# Patient Record
Sex: MALE | Race: WHITE | URBAN - METROPOLITAN AREA
[De-identification: names, ages, dates, MRNs, and addresses within clinical notes are randomized per-mention and may not be internally consistent; named-entity substitution may affect disease eponyms.]

---

## 2018-03-27 ENCOUNTER — APPOINTMENT (OUTPATIENT)
Dept: GENERAL RADIOLOGY | Facility: CLINIC | Age: 73
DRG: 368 | End: 2018-03-27
Attending: PHYSICIAN ASSISTANT
Payer: MEDICARE

## 2018-03-27 ENCOUNTER — APPOINTMENT (OUTPATIENT)
Dept: CT IMAGING | Facility: CLINIC | Age: 73
DRG: 368 | End: 2018-03-27
Attending: EMERGENCY MEDICINE
Payer: MEDICARE

## 2018-03-27 ENCOUNTER — HOSPITAL ENCOUNTER (INPATIENT)
Facility: CLINIC | Age: 73
LOS: 4 days | Discharge: HOME | DRG: 368 | End: 2018-03-31
Attending: EMERGENCY MEDICINE | Admitting: INTERNAL MEDICINE
Payer: MEDICARE

## 2018-03-27 ENCOUNTER — APPOINTMENT (OUTPATIENT)
Dept: ULTRASOUND IMAGING | Facility: CLINIC | Age: 73
DRG: 368 | End: 2018-03-27
Attending: PHYSICIAN ASSISTANT
Payer: MEDICARE

## 2018-03-27 ENCOUNTER — SURGERY (OUTPATIENT)
Age: 73
End: 2018-03-27

## 2018-03-27 ENCOUNTER — APPOINTMENT (OUTPATIENT)
Dept: GENERAL RADIOLOGY | Facility: CLINIC | Age: 73
DRG: 368 | End: 2018-03-27
Attending: EMERGENCY MEDICINE
Payer: MEDICARE

## 2018-03-27 DIAGNOSIS — J96.02 ACUTE RESPIRATORY FAILURE WITH HYPOXIA AND HYPERCAPNIA (H): ICD-10-CM

## 2018-03-27 DIAGNOSIS — G93.40 HEMORRHAGIC SHOCK AND ENCEPHALOPATHY SYNDROME (H): ICD-10-CM

## 2018-03-27 DIAGNOSIS — K92.2 UPPER GI BLEED: Primary | ICD-10-CM

## 2018-03-27 DIAGNOSIS — J96.01 ACUTE RESPIRATORY FAILURE WITH HYPOXIA AND HYPERCAPNIA (H): ICD-10-CM

## 2018-03-27 DIAGNOSIS — I85.01 BLEEDING ESOPHAGEAL VARICES, UNSPECIFIED ESOPHAGEAL VARICES TYPE (H): ICD-10-CM

## 2018-03-27 DIAGNOSIS — Q87.89 HEMORRHAGIC SHOCK AND ENCEPHALOPATHY SYNDROME (H): ICD-10-CM

## 2018-03-27 DIAGNOSIS — R57.8 HEMORRHAGIC SHOCK AND ENCEPHALOPATHY SYNDROME (H): ICD-10-CM

## 2018-03-27 DIAGNOSIS — K92.2 GASTROINTESTINAL HEMORRHAGE, UNSPECIFIED GASTROINTESTINAL HEMORRHAGE TYPE: ICD-10-CM

## 2018-03-27 DIAGNOSIS — D62 ANEMIA DUE TO BLOOD LOSS, ACUTE: ICD-10-CM

## 2018-03-27 DIAGNOSIS — R79.89 ELEVATED TROPONIN: ICD-10-CM

## 2018-03-27 DIAGNOSIS — E11.8 TYPE 2 DIABETES MELLITUS WITH COMPLICATION, UNSPECIFIED LONG TERM INSULIN USE STATUS: ICD-10-CM

## 2018-03-27 LAB
AFP SERPL-MCNC: 2.8 UG/L (ref 0–8)
ALBUMIN SERPL-MCNC: 2.6 G/DL (ref 3.4–5)
ALBUMIN SERPL-MCNC: 2.9 G/DL (ref 3.4–5)
ALP SERPL-CCNC: 65 U/L (ref 40–150)
ALP SERPL-CCNC: 67 U/L (ref 40–150)
ALT SERPL W P-5'-P-CCNC: 33 U/L (ref 0–70)
ALT SERPL W P-5'-P-CCNC: 34 U/L (ref 0–70)
ANION GAP SERPL CALCULATED.3IONS-SCNC: 11 MMOL/L (ref 3–14)
ANION GAP SERPL CALCULATED.3IONS-SCNC: 12 MMOL/L (ref 3–14)
ANION GAP SERPL CALCULATED.3IONS-SCNC: 14 MMOL/L (ref 3–14)
APTT PPP: 32 SEC (ref 22–37)
AST SERPL W P-5'-P-CCNC: 28 U/L (ref 0–45)
AST SERPL W P-5'-P-CCNC: 29 U/L (ref 0–45)
BASE DEFICIT BLDA-SCNC: 3.6 MMOL/L
BASE DEFICIT BLDV-SCNC: 2.7 MMOL/L
BASE DEFICIT BLDV-SCNC: 5.5 MMOL/L
BASOPHILS # BLD AUTO: 0.1 10E9/L (ref 0–0.2)
BASOPHILS NFR BLD AUTO: 0.7 %
BILIRUB SERPL-MCNC: 0.4 MG/DL (ref 0.2–1.3)
BILIRUB SERPL-MCNC: 0.9 MG/DL (ref 0.2–1.3)
BLD PROD TYP BPU: NORMAL
BLD UNIT ID BPU: 0
BLOOD PRODUCT CODE: NORMAL
BPU ID: NORMAL
BUN SERPL-MCNC: 46 MG/DL (ref 7–30)
BUN SERPL-MCNC: 50 MG/DL (ref 7–30)
BUN SERPL-MCNC: 59 MG/DL (ref 7–30)
CA-I SERPL ISE-MCNC: 4.4 MG/DL (ref 4.4–5.2)
CALCIUM SERPL-MCNC: 7.7 MG/DL (ref 8.5–10.1)
CALCIUM SERPL-MCNC: 7.8 MG/DL (ref 8.5–10.1)
CALCIUM SERPL-MCNC: 8.9 MG/DL (ref 8.5–10.1)
CHLORIDE SERPL-SCNC: 104 MMOL/L (ref 94–109)
CHLORIDE SERPL-SCNC: 108 MMOL/L (ref 94–109)
CHLORIDE SERPL-SCNC: 108 MMOL/L (ref 94–109)
CO2 SERPL-SCNC: 20 MMOL/L (ref 20–32)
CO2 SERPL-SCNC: 22 MMOL/L (ref 20–32)
CO2 SERPL-SCNC: 23 MMOL/L (ref 20–32)
CREAT SERPL-MCNC: 0.89 MG/DL (ref 0.66–1.25)
CREAT SERPL-MCNC: 0.98 MG/DL (ref 0.66–1.25)
CREAT SERPL-MCNC: 1.18 MG/DL (ref 0.66–1.25)
DIFFERENTIAL METHOD BLD: ABNORMAL
EOSINOPHIL # BLD AUTO: 0 10E9/L (ref 0–0.7)
EOSINOPHIL NFR BLD AUTO: 0.3 %
ERYTHROCYTE [DISTWIDTH] IN BLOOD BY AUTOMATED COUNT: 14.4 % (ref 10–15)
ERYTHROCYTE [DISTWIDTH] IN BLOOD BY AUTOMATED COUNT: 14.6 % (ref 10–15)
ERYTHROCYTE [DISTWIDTH] IN BLOOD BY AUTOMATED COUNT: 15.4 % (ref 10–15)
ERYTHROCYTE [DISTWIDTH] IN BLOOD BY AUTOMATED COUNT: 15.4 % (ref 10–15)
GFR SERPL CREATININE-BSD FRML MDRD: 60 ML/MIN/1.7M2
GFR SERPL CREATININE-BSD FRML MDRD: 75 ML/MIN/1.7M2
GFR SERPL CREATININE-BSD FRML MDRD: 84 ML/MIN/1.7M2
GLUCOSE BLDC GLUCOMTR-MCNC: 266 MG/DL (ref 70–99)
GLUCOSE BLDC GLUCOMTR-MCNC: 267 MG/DL (ref 70–99)
GLUCOSE BLDC GLUCOMTR-MCNC: 268 MG/DL (ref 70–99)
GLUCOSE BLDC GLUCOMTR-MCNC: 281 MG/DL (ref 70–99)
GLUCOSE SERPL-MCNC: 261 MG/DL (ref 70–99)
GLUCOSE SERPL-MCNC: 262 MG/DL (ref 70–99)
GLUCOSE SERPL-MCNC: 278 MG/DL (ref 70–99)
HBA1C MFR BLD: 8 % (ref 4.3–6)
HCO3 BLD-SCNC: 21 MMOL/L (ref 21–28)
HCO3 BLDV-SCNC: 22 MMOL/L (ref 21–28)
HCO3 BLDV-SCNC: 24 MMOL/L (ref 21–28)
HCT VFR BLD AUTO: 22.4 % (ref 40–53)
HCT VFR BLD AUTO: 23.7 % (ref 40–53)
HCT VFR BLD AUTO: 24.7 % (ref 40–53)
HCT VFR BLD AUTO: 24.9 % (ref 40–53)
HGB BLD-MCNC: 7.9 G/DL (ref 13.3–17.7)
HGB BLD-MCNC: 8.4 G/DL (ref 13.3–17.7)
HGB BLD-MCNC: 8.7 G/DL (ref 13.3–17.7)
HGB BLD-MCNC: 8.8 G/DL (ref 13.3–17.7)
IMM GRANULOCYTES # BLD: 0.1 10E9/L (ref 0–0.4)
IMM GRANULOCYTES NFR BLD: 0.5 %
INR PPP: 1.5 (ref 0.86–1.14)
INTERPRETATION ECG - MUSE: NORMAL
IRON SATN MFR SERPL: 66 % (ref 15–46)
IRON SERPL-MCNC: 173 UG/DL (ref 35–180)
LACTATE BLD-SCNC: 5.6 MMOL/L (ref 0.7–2)
LACTATE BLD-SCNC: 6.4 MMOL/L (ref 0.7–2)
LYMPHOCYTES # BLD AUTO: 1.7 10E9/L (ref 0.8–5.3)
LYMPHOCYTES NFR BLD AUTO: 14.2 %
MCH RBC QN AUTO: 33.2 PG (ref 26.5–33)
MCH RBC QN AUTO: 33.7 PG (ref 26.5–33)
MCH RBC QN AUTO: 34.5 PG (ref 26.5–33)
MCH RBC QN AUTO: 34.7 PG (ref 26.5–33)
MCHC RBC AUTO-ENTMCNC: 35.2 G/DL (ref 31.5–36.5)
MCHC RBC AUTO-ENTMCNC: 35.3 G/DL (ref 31.5–36.5)
MCHC RBC AUTO-ENTMCNC: 35.3 G/DL (ref 31.5–36.5)
MCHC RBC AUTO-ENTMCNC: 35.4 G/DL (ref 31.5–36.5)
MCV RBC AUTO: 94 FL (ref 78–100)
MCV RBC AUTO: 96 FL (ref 78–100)
MCV RBC AUTO: 98 FL (ref 78–100)
MCV RBC AUTO: 98 FL (ref 78–100)
MONOCYTES # BLD AUTO: 0.6 10E9/L (ref 0–1.3)
MONOCYTES NFR BLD AUTO: 5.2 %
NEUTROPHILS # BLD AUTO: 9.5 10E9/L (ref 1.6–8.3)
NEUTROPHILS NFR BLD AUTO: 79.1 %
NRBC # BLD AUTO: 0 10*3/UL
NRBC BLD AUTO-RTO: 0 /100
O2/TOTAL GAS SETTING VFR VENT: 40 %
O2/TOTAL GAS SETTING VFR VENT: 50 %
OXYHGB MFR BLD: 99 % (ref 92–100)
OXYHGB MFR BLDV: 69 %
OXYHGB MFR BLDV: 84 %
PCO2 BLD: 34 MM HG (ref 35–45)
PCO2 BLDV: 48 MM HG (ref 40–50)
PCO2 BLDV: 55 MM HG (ref 40–50)
PH BLD: 7.39 PH (ref 7.35–7.45)
PH BLDV: 7.21 PH (ref 7.32–7.43)
PH BLDV: 7.3 PH (ref 7.32–7.43)
PLATELET # BLD AUTO: 134 10E9/L (ref 150–450)
PLATELET # BLD AUTO: 144 10E9/L (ref 150–450)
PLATELET # BLD AUTO: 151 10E9/L (ref 150–450)
PLATELET # BLD AUTO: 184 10E9/L (ref 150–450)
PO2 BLD: 163 MM HG (ref 80–105)
PO2 BLDV: 43 MM HG (ref 25–47)
PO2 BLDV: 62 MM HG (ref 25–47)
POTASSIUM SERPL-SCNC: 5.3 MMOL/L (ref 3.4–5.3)
POTASSIUM SERPL-SCNC: 5.4 MMOL/L (ref 3.4–5.3)
POTASSIUM SERPL-SCNC: 5.6 MMOL/L (ref 3.4–5.3)
POTASSIUM SERPL-SCNC: 5.9 MMOL/L (ref 3.4–5.3)
PROT SERPL-MCNC: 5.5 G/DL (ref 6.8–8.8)
PROT SERPL-MCNC: 5.7 G/DL (ref 6.8–8.8)
RBC # BLD AUTO: 2.29 10E12/L (ref 4.4–5.9)
RBC # BLD AUTO: 2.42 10E12/L (ref 4.4–5.9)
RBC # BLD AUTO: 2.58 10E12/L (ref 4.4–5.9)
RBC # BLD AUTO: 2.65 10E12/L (ref 4.4–5.9)
SODIUM SERPL-SCNC: 140 MMOL/L (ref 133–144)
SODIUM SERPL-SCNC: 141 MMOL/L (ref 133–144)
SODIUM SERPL-SCNC: 141 MMOL/L (ref 133–144)
TIBC SERPL-MCNC: 261 UG/DL (ref 240–430)
TRANSFUSION STATUS PATIENT QL: NORMAL
TRANSFUSION STATUS PATIENT QL: NORMAL
TROPONIN I SERPL-MCNC: 0.05 UG/L (ref 0–0.04)
TROPONIN I SERPL-MCNC: 0.18 UG/L (ref 0–0.04)
TROPONIN I SERPL-MCNC: 0.29 UG/L (ref 0–0.04)
UPPER GI ENDOSCOPY: NORMAL
WBC # BLD AUTO: 12.1 10E9/L (ref 4–11)
WBC # BLD AUTO: 18.6 10E9/L (ref 4–11)
WBC # BLD AUTO: 25.3 10E9/L (ref 4–11)
WBC # BLD AUTO: 25.4 10E9/L (ref 4–11)

## 2018-03-27 PROCEDURE — 94640 AIRWAY INHALATION TREATMENT: CPT

## 2018-03-27 PROCEDURE — 00000146 ZZHCL STATISTIC GLUCOSE BY METER IP

## 2018-03-27 PROCEDURE — 99285 EMERGENCY DEPT VISIT HI MDM: CPT | Mod: 25

## 2018-03-27 PROCEDURE — 85027 COMPLETE CBC AUTOMATED: CPT | Performed by: PHYSICIAN ASSISTANT

## 2018-03-27 PROCEDURE — 25000128 H RX IP 250 OP 636: Performed by: EMERGENCY MEDICINE

## 2018-03-27 PROCEDURE — 25000128 H RX IP 250 OP 636: Performed by: PHYSICIAN ASSISTANT

## 2018-03-27 PROCEDURE — 84484 ASSAY OF TROPONIN QUANT: CPT | Performed by: EMERGENCY MEDICINE

## 2018-03-27 PROCEDURE — 82805 BLOOD GASES W/O2 SATURATION: CPT | Performed by: PHYSICIAN ASSISTANT

## 2018-03-27 PROCEDURE — 5A1945Z RESPIRATORY VENTILATION, 24-96 CONSECUTIVE HOURS: ICD-10-PCS | Performed by: INTERNAL MEDICINE

## 2018-03-27 PROCEDURE — 25000131 ZZH RX MED GY IP 250 OP 636 PS 637: Mod: GY | Performed by: EMERGENCY MEDICINE

## 2018-03-27 PROCEDURE — 76705 ECHO EXAM OF ABDOMEN: CPT

## 2018-03-27 PROCEDURE — 25000128 H RX IP 250 OP 636: Performed by: INTERNAL MEDICINE

## 2018-03-27 PROCEDURE — 40000275 ZZH STATISTIC RCP TIME EA 10 MIN

## 2018-03-27 PROCEDURE — 84132 ASSAY OF SERUM POTASSIUM: CPT | Performed by: PHYSICIAN ASSISTANT

## 2018-03-27 PROCEDURE — 86901 BLOOD TYPING SEROLOGIC RH(D): CPT | Performed by: EMERGENCY MEDICINE

## 2018-03-27 PROCEDURE — 40000276 ZZH STATISTIC RCP TIME ED VENT EA 10 MIN

## 2018-03-27 PROCEDURE — 80048 BASIC METABOLIC PNL TOTAL CA: CPT | Performed by: INTERNAL MEDICINE

## 2018-03-27 PROCEDURE — 25000125 ZZHC RX 250: Performed by: PHYSICIAN ASSISTANT

## 2018-03-27 PROCEDURE — 36620 INSERTION CATHETER ARTERY: CPT | Performed by: INTERNAL MEDICINE

## 2018-03-27 PROCEDURE — 83540 ASSAY OF IRON: CPT | Performed by: EMERGENCY MEDICINE

## 2018-03-27 PROCEDURE — 40000008 ZZH STATISTIC AIRWAY CARE

## 2018-03-27 PROCEDURE — 86900 BLOOD TYPING SEROLOGIC ABO: CPT | Performed by: EMERGENCY MEDICINE

## 2018-03-27 PROCEDURE — 25000125 ZZHC RX 250

## 2018-03-27 PROCEDURE — 25000125 ZZHC RX 250: Performed by: INTERNAL MEDICINE

## 2018-03-27 PROCEDURE — 43244 EGD VARICES LIGATION: CPT | Performed by: INTERNAL MEDICINE

## 2018-03-27 PROCEDURE — 25000128 H RX IP 250 OP 636

## 2018-03-27 PROCEDURE — 86850 RBC ANTIBODY SCREEN: CPT | Performed by: EMERGENCY MEDICINE

## 2018-03-27 PROCEDURE — 86706 HEP B SURFACE ANTIBODY: CPT | Performed by: EMERGENCY MEDICINE

## 2018-03-27 PROCEDURE — A9270 NON-COVERED ITEM OR SERVICE: HCPCS | Mod: GY | Performed by: PHYSICIAN ASSISTANT

## 2018-03-27 PROCEDURE — 85610 PROTHROMBIN TIME: CPT | Performed by: EMERGENCY MEDICINE

## 2018-03-27 PROCEDURE — 31500 INSERT EMERGENCY AIRWAY: CPT

## 2018-03-27 PROCEDURE — 25000131 ZZH RX MED GY IP 250 OP 636 PS 637: Mod: GY | Performed by: PHYSICIAN ASSISTANT

## 2018-03-27 PROCEDURE — 80053 COMPREHEN METABOLIC PANEL: CPT | Performed by: PHYSICIAN ASSISTANT

## 2018-03-27 PROCEDURE — 86923 COMPATIBILITY TEST ELECTRIC: CPT | Performed by: EMERGENCY MEDICINE

## 2018-03-27 PROCEDURE — 82805 BLOOD GASES W/O2 SATURATION: CPT | Performed by: INTERNAL MEDICINE

## 2018-03-27 PROCEDURE — 96366 THER/PROPH/DIAG IV INF ADDON: CPT

## 2018-03-27 PROCEDURE — 86704 HEP B CORE ANTIBODY TOTAL: CPT | Performed by: EMERGENCY MEDICINE

## 2018-03-27 PROCEDURE — 86803 HEPATITIS C AB TEST: CPT | Performed by: EMERGENCY MEDICINE

## 2018-03-27 PROCEDURE — 82330 ASSAY OF CALCIUM: CPT | Performed by: PHYSICIAN ASSISTANT

## 2018-03-27 PROCEDURE — 85730 THROMBOPLASTIN TIME PARTIAL: CPT | Performed by: EMERGENCY MEDICINE

## 2018-03-27 PROCEDURE — 25000131 ZZH RX MED GY IP 250 OP 636 PS 637: Mod: GY | Performed by: INTERNAL MEDICINE

## 2018-03-27 PROCEDURE — 96365 THER/PROPH/DIAG IV INF INIT: CPT

## 2018-03-27 PROCEDURE — 85027 COMPLETE CBC AUTOMATED: CPT | Performed by: INTERNAL MEDICINE

## 2018-03-27 PROCEDURE — 25000125 ZZHC RX 250: Performed by: EMERGENCY MEDICINE

## 2018-03-27 PROCEDURE — 20000003 ZZH R&B ICU

## 2018-03-27 PROCEDURE — 25000132 ZZH RX MED GY IP 250 OP 250 PS 637: Mod: GY | Performed by: PHYSICIAN ASSISTANT

## 2018-03-27 PROCEDURE — 83605 ASSAY OF LACTIC ACID: CPT | Performed by: PHYSICIAN ASSISTANT

## 2018-03-27 PROCEDURE — 83550 IRON BINDING TEST: CPT | Performed by: EMERGENCY MEDICINE

## 2018-03-27 PROCEDURE — 80053 COMPREHEN METABOLIC PANEL: CPT | Performed by: EMERGENCY MEDICINE

## 2018-03-27 PROCEDURE — 83036 HEMOGLOBIN GLYCOSYLATED A1C: CPT | Performed by: PHYSICIAN ASSISTANT

## 2018-03-27 PROCEDURE — P9016 RBC LEUKOCYTES REDUCED: HCPCS | Performed by: EMERGENCY MEDICINE

## 2018-03-27 PROCEDURE — 36556 INSERT NON-TUNNEL CV CATH: CPT | Performed by: PHYSICIAN ASSISTANT

## 2018-03-27 PROCEDURE — 75635 CT ANGIO ABDOMINAL ARTERIES: CPT

## 2018-03-27 PROCEDURE — 84484 ASSAY OF TROPONIN QUANT: CPT | Performed by: PHYSICIAN ASSISTANT

## 2018-03-27 PROCEDURE — 06L38CZ OCCLUSION OF ESOPHAGEAL VEIN WITH EXTRALUMINAL DEVICE, VIA NATURAL OR ARTIFICIAL OPENING ENDOSCOPIC: ICD-10-PCS | Performed by: INTERNAL MEDICINE

## 2018-03-27 PROCEDURE — 83605 ASSAY OF LACTIC ACID: CPT | Performed by: INTERNAL MEDICINE

## 2018-03-27 PROCEDURE — 40000986 XR CHEST PORT 1 VW

## 2018-03-27 PROCEDURE — 02HV33Z INSERTION OF INFUSION DEVICE INTO SUPERIOR VENA CAVA, PERCUTANEOUS APPROACH: ICD-10-PCS | Performed by: INTERNAL MEDICINE

## 2018-03-27 PROCEDURE — 82105 ALPHA-FETOPROTEIN SERUM: CPT | Performed by: INTERNAL MEDICINE

## 2018-03-27 PROCEDURE — 36415 COLL VENOUS BLD VENIPUNCTURE: CPT | Performed by: PHYSICIAN ASSISTANT

## 2018-03-27 PROCEDURE — 93005 ELECTROCARDIOGRAM TRACING: CPT

## 2018-03-27 PROCEDURE — 99291 CRITICAL CARE FIRST HOUR: CPT | Mod: 25 | Performed by: INTERNAL MEDICINE

## 2018-03-27 PROCEDURE — 96375 TX/PRO/DX INJ NEW DRUG ADDON: CPT

## 2018-03-27 PROCEDURE — 71045 X-RAY EXAM CHEST 1 VIEW: CPT

## 2018-03-27 PROCEDURE — 85025 COMPLETE CBC W/AUTO DIFF WBC: CPT | Performed by: EMERGENCY MEDICINE

## 2018-03-27 RX ORDER — OCTREOTIDE ACETATE 50 UG/ML
50 INJECTION, SOLUTION INTRAVENOUS; SUBCUTANEOUS ONCE
Status: COMPLETED | OUTPATIENT
Start: 2018-03-27 | End: 2018-03-27

## 2018-03-27 RX ORDER — NALOXONE HYDROCHLORIDE 0.4 MG/ML
.1-.4 INJECTION, SOLUTION INTRAMUSCULAR; INTRAVENOUS; SUBCUTANEOUS
Status: DISCONTINUED | OUTPATIENT
Start: 2018-03-27 | End: 2018-03-31 | Stop reason: HOSPADM

## 2018-03-27 RX ORDER — SODIUM CHLORIDE 9 MG/ML
1000 INJECTION, SOLUTION INTRAVENOUS CONTINUOUS
Status: DISCONTINUED | OUTPATIENT
Start: 2018-03-27 | End: 2018-03-27

## 2018-03-27 RX ORDER — ALBUMIN, HUMAN INJ 5% 5 %
SOLUTION INTRAVENOUS
Status: DISCONTINUED
Start: 2018-03-27 | End: 2018-03-27 | Stop reason: WASHOUT

## 2018-03-27 RX ORDER — METOCLOPRAMIDE HYDROCHLORIDE 5 MG/ML
10 INJECTION INTRAMUSCULAR; INTRAVENOUS ONCE
Status: DISCONTINUED | OUTPATIENT
Start: 2018-03-27 | End: 2018-03-27

## 2018-03-27 RX ORDER — DEXTROSE MONOHYDRATE 25 G/50ML
25-50 INJECTION, SOLUTION INTRAVENOUS
Status: DISCONTINUED | OUTPATIENT
Start: 2018-03-27 | End: 2018-03-27

## 2018-03-27 RX ORDER — NALOXONE HYDROCHLORIDE 0.4 MG/ML
.1-.4 INJECTION, SOLUTION INTRAMUSCULAR; INTRAVENOUS; SUBCUTANEOUS
Status: DISCONTINUED | OUTPATIENT
Start: 2018-03-27 | End: 2018-03-27

## 2018-03-27 RX ORDER — DEXTROSE MONOHYDRATE 100 MG/ML
INJECTION, SOLUTION INTRAVENOUS CONTINUOUS
Status: DISCONTINUED | OUTPATIENT
Start: 2018-03-27 | End: 2018-03-27

## 2018-03-27 RX ORDER — VECURONIUM BROMIDE 1 MG/ML
INJECTION, POWDER, LYOPHILIZED, FOR SOLUTION INTRAVENOUS
Status: COMPLETED
Start: 2018-03-27 | End: 2018-03-27

## 2018-03-27 RX ORDER — NOREPINEPHRINE BITARTRATE/D5W 16MG/250ML
0.03-0.4 PLASTIC BAG, INJECTION (ML) INTRAVENOUS CONTINUOUS
Status: DISCONTINUED | OUTPATIENT
Start: 2018-03-27 | End: 2018-03-27

## 2018-03-27 RX ORDER — NICOTINE POLACRILEX 4 MG
15-30 LOZENGE BUCCAL
Status: DISCONTINUED | OUTPATIENT
Start: 2018-03-27 | End: 2018-03-31 | Stop reason: HOSPADM

## 2018-03-27 RX ORDER — DEXTROSE MONOHYDRATE 25 G/50ML
25-50 INJECTION, SOLUTION INTRAVENOUS
Status: DISCONTINUED | OUTPATIENT
Start: 2018-03-27 | End: 2018-03-31 | Stop reason: HOSPADM

## 2018-03-27 RX ORDER — LIDOCAINE 40 MG/G
CREAM TOPICAL
Status: CANCELLED | OUTPATIENT
Start: 2018-03-27

## 2018-03-27 RX ORDER — DEXTROSE MONOHYDRATE 25 G/50ML
25 INJECTION, SOLUTION INTRAVENOUS ONCE
Status: DISCONTINUED | OUTPATIENT
Start: 2018-03-27 | End: 2018-03-27

## 2018-03-27 RX ORDER — ALBUTEROL SULFATE 0.83 MG/ML
10 SOLUTION RESPIRATORY (INHALATION) ONCE
Status: COMPLETED | OUTPATIENT
Start: 2018-03-27 | End: 2018-03-27

## 2018-03-27 RX ORDER — FENTANYL CITRATE 50 UG/ML
25 INJECTION, SOLUTION INTRAMUSCULAR; INTRAVENOUS EVERY 5 MIN PRN
Status: DISCONTINUED | OUTPATIENT
Start: 2018-03-27 | End: 2018-03-27

## 2018-03-27 RX ORDER — ONDANSETRON 4 MG/1
4 TABLET, ORALLY DISINTEGRATING ORAL EVERY 6 HOURS PRN
Status: DISCONTINUED | OUTPATIENT
Start: 2018-03-27 | End: 2018-03-31 | Stop reason: HOSPADM

## 2018-03-27 RX ORDER — ONDANSETRON 2 MG/ML
4 INJECTION INTRAMUSCULAR; INTRAVENOUS EVERY 6 HOURS PRN
Status: DISCONTINUED | OUTPATIENT
Start: 2018-03-27 | End: 2018-03-31 | Stop reason: HOSPADM

## 2018-03-27 RX ORDER — HYDROMORPHONE HYDROCHLORIDE 1 MG/ML
0.2 INJECTION, SOLUTION INTRAMUSCULAR; INTRAVENOUS; SUBCUTANEOUS
Status: DISCONTINUED | OUTPATIENT
Start: 2018-03-27 | End: 2018-03-31 | Stop reason: HOSPADM

## 2018-03-27 RX ORDER — SODIUM CHLORIDE 9 MG/ML
INJECTION, SOLUTION INTRAVENOUS CONTINUOUS
Status: DISCONTINUED | OUTPATIENT
Start: 2018-03-27 | End: 2018-03-30

## 2018-03-27 RX ORDER — CHLORHEXIDINE GLUCONATE ORAL RINSE 1.2 MG/ML
15 SOLUTION DENTAL EVERY 12 HOURS
Status: DISCONTINUED | OUTPATIENT
Start: 2018-03-27 | End: 2018-03-29

## 2018-03-27 RX ORDER — CEFTRIAXONE 1 G/1
1 INJECTION, POWDER, FOR SOLUTION INTRAMUSCULAR; INTRAVENOUS EVERY 24 HOURS
Status: DISCONTINUED | OUTPATIENT
Start: 2018-03-27 | End: 2018-03-31 | Stop reason: HOSPADM

## 2018-03-27 RX ORDER — IOPAMIDOL 755 MG/ML
100 INJECTION, SOLUTION INTRAVASCULAR ONCE
Status: COMPLETED | OUTPATIENT
Start: 2018-03-27 | End: 2018-03-27

## 2018-03-27 RX ORDER — PROPOFOL 10 MG/ML
5-75 INJECTION, EMULSION INTRAVENOUS CONTINUOUS
Status: DISCONTINUED | OUTPATIENT
Start: 2018-03-27 | End: 2018-03-29

## 2018-03-27 RX ORDER — FENTANYL CITRATE 50 UG/ML
50 INJECTION, SOLUTION INTRAMUSCULAR; INTRAVENOUS
Status: DISCONTINUED | OUTPATIENT
Start: 2018-03-27 | End: 2018-03-27

## 2018-03-27 RX ORDER — LORAZEPAM 2 MG/ML
INJECTION INTRAMUSCULAR
Status: COMPLETED
Start: 2018-03-27 | End: 2018-03-27

## 2018-03-27 RX ORDER — AMOXICILLIN 250 MG
1 CAPSULE ORAL 2 TIMES DAILY PRN
Status: DISCONTINUED | OUTPATIENT
Start: 2018-03-27 | End: 2018-03-30

## 2018-03-27 RX ORDER — AMOXICILLIN 250 MG
2 CAPSULE ORAL 2 TIMES DAILY PRN
Status: DISCONTINUED | OUTPATIENT
Start: 2018-03-27 | End: 2018-03-30

## 2018-03-27 RX ORDER — ETOMIDATE 2 MG/ML
INJECTION INTRAVENOUS
Status: DISCONTINUED
Start: 2018-03-27 | End: 2018-03-27 | Stop reason: HOSPADM

## 2018-03-27 RX ORDER — FLUMAZENIL 0.1 MG/ML
0.2 INJECTION, SOLUTION INTRAVENOUS
Status: DISCONTINUED | OUTPATIENT
Start: 2018-03-27 | End: 2018-03-27

## 2018-03-27 RX ORDER — PROPOFOL 10 MG/ML
5-75 INJECTION, EMULSION INTRAVENOUS CONTINUOUS
Status: DISCONTINUED | OUTPATIENT
Start: 2018-03-27 | End: 2018-03-27

## 2018-03-27 RX ORDER — NICOTINE POLACRILEX 4 MG
15-30 LOZENGE BUCCAL
Status: DISCONTINUED | OUTPATIENT
Start: 2018-03-27 | End: 2018-03-27

## 2018-03-27 RX ORDER — CEFTRIAXONE 2 G/1
2 INJECTION, POWDER, FOR SOLUTION INTRAMUSCULAR; INTRAVENOUS ONCE
Status: COMPLETED | OUTPATIENT
Start: 2018-03-27 | End: 2018-03-27

## 2018-03-27 RX ORDER — NOREPINEPHRINE BITARTRATE/D5W 16MG/250ML
0.03-0.4 PLASTIC BAG, INJECTION (ML) INTRAVENOUS CONTINUOUS
Status: DISCONTINUED | OUTPATIENT
Start: 2018-03-27 | End: 2018-03-29

## 2018-03-27 RX ORDER — ETOMIDATE 2 MG/ML
10 INJECTION INTRAVENOUS ONCE
Status: COMPLETED | OUTPATIENT
Start: 2018-03-27 | End: 2018-03-27

## 2018-03-27 RX ORDER — ONDANSETRON 2 MG/ML
4 INJECTION INTRAMUSCULAR; INTRAVENOUS EVERY 30 MIN PRN
Status: DISCONTINUED | OUTPATIENT
Start: 2018-03-27 | End: 2018-03-27

## 2018-03-27 RX ORDER — TAMSULOSIN HYDROCHLORIDE 0.4 MG/1
0.4 CAPSULE ORAL DAILY
COMMUNITY

## 2018-03-27 RX ADMIN — ALBUTEROL SULFATE 10 MG: 2.5 SOLUTION RESPIRATORY (INHALATION) at 18:24

## 2018-03-27 RX ADMIN — PROPOFOL 60 MCG/KG/MIN: 10 INJECTION, EMULSION INTRAVENOUS at 13:02

## 2018-03-27 RX ADMIN — ETOMIDATE 10 MG: 20 INJECTION, SOLUTION INTRAVENOUS at 12:09

## 2018-03-27 RX ADMIN — SODIUM CHLORIDE 8 MG/HR: 9 INJECTION, SOLUTION INTRAVENOUS at 15:23

## 2018-03-27 RX ADMIN — PROPOFOL 50 MCG/KG/MIN: 10 INJECTION, EMULSION INTRAVENOUS at 15:04

## 2018-03-27 RX ADMIN — SODIUM CHLORIDE 1000 ML: 9 INJECTION, SOLUTION INTRAVENOUS at 12:53

## 2018-03-27 RX ADMIN — PROPOFOL 30 MCG/KG/MIN: 10 INJECTION, EMULSION INTRAVENOUS at 20:52

## 2018-03-27 RX ADMIN — IOPAMIDOL 100 ML: 755 INJECTION, SOLUTION INTRAVENOUS at 11:55

## 2018-03-27 RX ADMIN — SODIUM CHLORIDE 3.5 UNITS/HR: 9 INJECTION, SOLUTION INTRAVENOUS at 22:23

## 2018-03-27 RX ADMIN — ONDANSETRON 4 MG: 2 INJECTION INTRAMUSCULAR; INTRAVENOUS at 11:40

## 2018-03-27 RX ADMIN — PROPOFOL 40 MCG/KG/MIN: 10 INJECTION, EMULSION INTRAVENOUS at 12:43

## 2018-03-27 RX ADMIN — SODIUM CHLORIDE: 9 INJECTION, SOLUTION INTRAVENOUS at 20:58

## 2018-03-27 RX ADMIN — VECURONIUM BROMIDE 10 MG: 1 INJECTION, POWDER, LYOPHILIZED, FOR SOLUTION INTRAVENOUS at 13:00

## 2018-03-27 RX ADMIN — PROPOFOL 20 MCG/KG/MIN: 10 INJECTION, EMULSION INTRAVENOUS at 12:36

## 2018-03-27 RX ADMIN — LORAZEPAM 2 MG: 2 INJECTION INTRAMUSCULAR; INTRAVENOUS at 13:00

## 2018-03-27 RX ADMIN — PROPOFOL 10 MCG/KG/MIN: 10 INJECTION, EMULSION INTRAVENOUS at 12:29

## 2018-03-27 RX ADMIN — SODIUM CHLORIDE, PRESERVATIVE FREE 80 ML: 5 INJECTION INTRAVENOUS at 11:55

## 2018-03-27 RX ADMIN — PROPOFOL 5 MCG/KG/MIN: 10 INJECTION, EMULSION INTRAVENOUS at 12:20

## 2018-03-27 RX ADMIN — FENTANYL CITRATE 25 MCG: 50 INJECTION INTRAMUSCULAR; INTRAVENOUS at 16:30

## 2018-03-27 RX ADMIN — PANTOPRAZOLE SODIUM 80 MG: 40 INJECTION, POWDER, FOR SOLUTION INTRAVENOUS at 11:32

## 2018-03-27 RX ADMIN — PHYTONADIONE 5 MG: 10 INJECTION, EMULSION INTRAMUSCULAR; INTRAVENOUS; SUBCUTANEOUS at 17:33

## 2018-03-27 RX ADMIN — OCTREOTIDE ACETATE 50 MCG/HR: 200 INJECTION, SOLUTION INTRAVENOUS; SUBCUTANEOUS at 14:12

## 2018-03-27 RX ADMIN — OCTREOTIDE ACETATE 50 MCG: 50 INJECTION, SOLUTION INTRAVENOUS; SUBCUTANEOUS at 13:54

## 2018-03-27 RX ADMIN — SUCCINYLCHOLINE CHLORIDE 150 MG: 20 INJECTION, SOLUTION INTRAMUSCULAR; INTRAVENOUS at 12:09

## 2018-03-27 RX ADMIN — SODIUM CHLORIDE 1000 ML: 9 INJECTION, SOLUTION INTRAVENOUS at 11:58

## 2018-03-27 RX ADMIN — INSULIN ASPART 6 UNITS: 100 INJECTION, SOLUTION INTRAVENOUS; SUBCUTANEOUS at 17:52

## 2018-03-27 RX ADMIN — SODIUM CHLORIDE 500 ML: 9 INJECTION, SOLUTION INTRAVENOUS at 20:57

## 2018-03-27 RX ADMIN — CEFTRIAXONE 2 G: 2 INJECTION, POWDER, FOR SOLUTION INTRAMUSCULAR; INTRAVENOUS at 14:09

## 2018-03-27 RX ADMIN — PROPOFOL 15 MCG/KG/MIN: 10 INJECTION, EMULSION INTRAVENOUS at 12:33

## 2018-03-27 RX ADMIN — SODIUM CHLORIDE 1000 ML: 9 INJECTION, SOLUTION INTRAVENOUS at 11:59

## 2018-03-27 RX ADMIN — SODIUM CHLORIDE 8 MG/HR: 9 INJECTION, SOLUTION INTRAVENOUS at 23:29

## 2018-03-27 RX ADMIN — PROPOFOL 5 MCG/KG/MIN: 10 INJECTION, EMULSION INTRAVENOUS at 12:25

## 2018-03-27 RX ADMIN — NOREPINEPHRINE BITARTRATE 0.03 MCG/KG/MIN: 1 INJECTION INTRAVENOUS at 18:07

## 2018-03-27 RX ADMIN — Medication 0.05 MCG/KG/MIN: at 20:53

## 2018-03-27 RX ADMIN — CHLORHEXIDINE GLUCONATE 15 ML: 1.2 RINSE ORAL at 22:42

## 2018-03-27 ASSESSMENT — ENCOUNTER SYMPTOMS
VOMITING: 1
DIZZINESS: 0
LIGHT-HEADEDNESS: 0
SHORTNESS OF BREATH: 0
BLOOD IN STOOL: 1
ABDOMINAL PAIN: 0
WEAKNESS: 0

## 2018-03-27 NOTE — PROCEDURES
"Procedure/Surgery Information   [facility]    Bedside Procedure Note  Date of Service (when I performed the procedure): 03/27/2018    Juan Quiroz is a 72 year old male patient.  1. Gastrointestinal hemorrhage, unspecified gastrointestinal hemorrhage type    2. Anemia due to blood loss, acute    3. Elevated troponin, likely demand-related ischemia    4. Bleeding esophageal and gastric varices, unspecified esophageal varices type (H)      Past Medical History:   Diagnosis Date     Aneurysm (H)      Chronic kidney disease     MASS     Diabetes (H)      Pancreatitis      Temp: 96.4  F (35.8  C) Temp src: Bladder BP: 96/44   Heart Rate: 71 Resp: 17 SpO2: 100 % O2 Device: Mechanical Ventilator      Central line  Date/Time: 3/27/2018 4:49 PM  Performed by: CHIOMA BOWMAN  Authorized by: CHIOMA BOWMAN   Consent: Verbal consent obtained.  Consent given by: spouse  Patient identity confirmed: arm band  Time out: Immediately prior to procedure a \"time out\" was called to verify the correct patient, procedure, equipment, support staff and site/side marked as required.  Indications: vascular access    Sedation:  Patient sedated: yes  Sedatives: propofol    Preparation: skin prepped with chlorhexidine  Location details: right internal jugular  Patient position: Trendelenburg  Catheter type: triple lumen  Ultrasound guidance: yes  Number of attempts: 2  Successful placement: yes  Post-procedure: line sutured and dressing applied  Assessment: blood return through all parts  Patient tolerance: Patient tolerated the procedure well with no immediate complications  Comments: Procedure supervised by Dr. Barahona. Pending placement confirmation with CXR.            Chioma Bowman  "

## 2018-03-27 NOTE — H&P
[facility]    History and Physical/ Consult  Critical Care Service     Date of Admission:  3/27/2018  Date of Service (when I saw the patient): 03/27/18    Problem List  Upper GI bleed 2/2 esophageal varices s/p banding   Newly diagnosed cirrhosis, unclear etiology  Intubation fir airway protection   Acute blood loss anemia  Thrombocytopenia   Hypotension likely 2/2 hypovolemia     Assessment & Plan   Juan Quiroz is a 72 year old male with PMHx of recently diagnosed 4cm AAA and type II DM who presents with bright red hematemesis, intubated for airway protection. Found to have esophageal and gastric varices on EGD s/p banding x5 with new diagnosis of cirrhosis.    Neuro  1. Acute pain  2. Sedation  Plan:  -- propofol gtt for sedation  -- PRN dilaudid as needed     CV  1. Hypotension, likely 2/2 hypovolemia   2. AAA, recently diagnosed   Plan:  -- small troponin elevated 0.049, recheck troponin   -- CT abdomen shows AAA 4.9cm, previously recorded at 4cm  -- check VBG, check LA    Resp:  1. Intubation for airway protection   Plan:  -- AC ventilation   -- CXR shows appropriately placed ETT  -- can PS and extubate once hemodynamically stable     GI/Nutrition  1. GI bleed-hematemesis with bright red blood- found to have varices on EGD s/p banding   2. Cirrhosis, newly diagnosed, unclear etiology   Plan:  -- NPO  -- PPI gtt and octreotide gtt   -- GI following   -- pending work up for cirrhosis includes alpha 1 antitrypsin, hepatitis panel, mitochondrial antibody  -- underwent EGD on 3/27 in ED, found to have 2 areas of variceal bleeding in esophagus and stomach, banded x5  -- INR elevated to 1.5, platelets low at 134 all suggestive of cirrhosis   -- will give 5mg vitamin K  -- ALT, AST, alk phos, bili WNL  -- will obtain additional history from wife     Renal/  1. No prior hx.  Baseline appears to be 0.89  Plan:  -- Monitor Cr and UOP  -- keep bliss cath in place   -- repeat  CMP    ID  1. Leukocytosis   Plan:  -- likely reactive, recheck WBC  -- ceftriaxone post procedure     Endocrine  1. Type II DM, hyperglycemia   Plan:  -- high SSI, will add insulin gtt if needed   -- PTA agents, glipizide and metformin   -- Keep BG  <180 for optimal healing    Heme:  1. Acute blood loss anemia  2. Thrombocytopenia   3. Coagulopathy   Plan:  -- Hgb 8.4, platelets 134, INR 1.5  -- will start with 1 unit PRBCs  -- give 5mg vit K  -- recheck Hgb  -- Monitor hemoglobin.  -- Transfuse to keep > 7.0    General cares:  DVT Prophylaxis: Pneumatic Compression Devices  GI Prophylaxis: PPI  Restraints: Restraints for medical healing needed: YES  Family update by me today: No  Current lines are required for patient management  Access:  Peripheral IV (18 x3)    Chioma Bowman    Time Spent on this Encounter   Billing:  I spent 45 minutes bedside and on the inpatient unit today managing the critical care of Juan Quiroz in relation to the issues listed in this note.    This is time outside of procedure time.     Code Status   Need to discuss with family     Primary Care Physician   No primary care provider on file.    Chief Complaint   Hematemesis     History is obtained from the patient and electronic health record    History of Present Illness   Juan Quiroz is a 72 year old male with PMHx of recently diagnosed 4cm AAA who presents with hematemesis. The patient lives in [place] and was on a flight to [place] when he began to have hematemesis this morning on his flight. Per chart review, the patient did not one dark stool 2 days prior to this episode. The patient was met by EMS after landing. He was noted to be hypotensive and continued to have hematemesis in route.     On admission to the ED, the patient was afebrile, hypotensive with systolics in the 80s, HR 70-80. Patient was intubated for airway protection. Labs remarkable for a blood sugar of 261. Hgb 8.4, platelets 134, WBC 12.1. Trop slightly  elevated at 0.049. CT angio abdominal showed no evidence of Gi bleed, infrarenal AAA is 4.9, evidence of cirrhosis and splenomegaly. GI was consulted. The patient was started on protonix, given 2 L NS. Patient underwent an EGD in the ED.     Past Medical History    4cm AAA  Type II DM    Past Surgical History   I have reviewed this patient's surgical history and updated it with pertinent information if needed.  Past Surgical History:   Procedure Laterality Date     EYE SURGERY      CATARACT       Prior to Admission Medications   Prior to Admission Medications   Prescriptions Last Dose Informant Patient Reported? Taking?   Celecoxib (CELEBREX PO)   Yes Yes   Sig: Take 200 mg by mouth as needed for moderate pain   GLIPIZIDE XL PO   Yes Yes   Sig: Take 5 mg by mouth   METFORMIN HCL PO   Yes Yes   Sig: Take 1,000 mg by mouth   tamsulosin (FLOMAX) 0.4 MG capsule   Yes Yes   Sig: Take 0.4 mg by mouth daily      Facility-Administered Medications: None     Allergies   Allergies   Allergen Reactions     Prednisone        Social History   I have reviewed this patient's social history and updated it with pertinent information if needed. Juan CLAY Jamesonviktoriajenna   Patient lives in [place] and was on a flight to [place]. No apparent drinking hx.     Family History   I have reviewed this patient's family history and updated it with pertinent information if needed.   No family history on file.    Review of Systems   Unable to assess as patient intubated and sedated.     Physical Exam   Temp: 98  F (36.7  C)   Temp  Min: 98  F (36.7  C)  Max: 98  F (36.7  C) BP: 125/63   Heart Rate: 96 Resp: 23 SpO2: 100 % O2 Device: None (Room air)    Vital Signs with Ranges  Temp:  [98  F (36.7  C)] 98  F (36.7  C)  Heart Rate:  [] 96  Resp:  [7-27] 23  BP: ()/() 125/63  SpO2:  [98 %-100 %] 100 %  180 lbs 0 oz    Constitutional:  male, laying in bed, sedated on exam   HEENT: atraumatic, normocephalic, PERRL, non-icteric    Respiratory: CTAB, no wheezes or rales   Cardiovascular: RRR  GI: abdomen is soft, nondistended   Genitourinary: bliss cath in place, urine is clear and yellow   Skin: warm and dry   Musculoskeletal: no lower extremity edema   Neurologic: sedated on exam, PERRL    Data   Results for orders placed or performed during the hospital encounter of 03/27/18 (from the past 24 hour(s))   POC US ABDOMEN LIMITED    Impression    AAA identified, but no signs of rupture.   CBC with platelets differential   Result Value Ref Range    WBC 12.1 (H) 4.0 - 11.0 10e9/L    RBC Count 2.42 (L) 4.4 - 5.9 10e12/L    Hemoglobin 8.4 (L) 13.3 - 17.7 g/dL    Hematocrit 23.7 (L) 40.0 - 53.0 %    MCV 98 78 - 100 fl    MCH 34.7 (H) 26.5 - 33.0 pg    MCHC 35.4 31.5 - 36.5 g/dL    RDW 14.4 10.0 - 15.0 %    Platelet Count 134 (L) 150 - 450 10e9/L    Diff Method Automated Method     % Neutrophils 79.1 %    % Lymphocytes 14.2 %    % Monocytes 5.2 %    % Eosinophils 0.3 %    % Basophils 0.7 %    % Immature Granulocytes 0.5 %    Nucleated RBCs 0 0 /100    Absolute Neutrophil 9.5 (H) 1.6 - 8.3 10e9/L    Absolute Lymphocytes 1.7 0.8 - 5.3 10e9/L    Absolute Monocytes 0.6 0.0 - 1.3 10e9/L    Absolute Eosinophils 0.0 0.0 - 0.7 10e9/L    Absolute Basophils 0.1 0.0 - 0.2 10e9/L    Abs Immature Granulocytes 0.1 0 - 0.4 10e9/L    Absolute Nucleated RBC 0.0    INR   Result Value Ref Range    INR 1.50 (H) 0.86 - 1.14   ABO/Rh type and screen   Result Value Ref Range    Units Ordered 1     ABO B     RH(D) Pos     Antibody Screen Neg     Test Valid Only At [facility]        Specimen Expires 03/30/2018     Crossmatch Red Blood Cells    Partial thromboplastin time   Result Value Ref Range    PTT 32 22 - 37 sec   Comprehensive metabolic panel   Result Value Ref Range    Sodium 141 133 - 144 mmol/L    Potassium 5.3 3.4 - 5.3 mmol/L    Chloride 104 94 - 109 mmol/L    Carbon Dioxide 23 20 - 32 mmol/L    Anion Gap 14 3 - 14 mmol/L    Glucose 261 (H) 70 -  99 mg/dL    Urea Nitrogen 46 (H) 7 - 30 mg/dL    Creatinine 0.89 0.66 - 1.25 mg/dL    GFR Estimate 84 >60 mL/min/1.7m2    GFR Estimate If Black >90 >60 mL/min/1.7m2    Calcium 8.9 8.5 - 10.1 mg/dL    Bilirubin Total 0.9 0.2 - 1.3 mg/dL    Albumin 2.9 (L) 3.4 - 5.0 g/dL    Protein Total 5.7 (L) 6.8 - 8.8 g/dL    Alkaline Phosphatase 67 40 - 150 U/L    ALT 33 0 - 70 U/L    AST 28 0 - 45 U/L   Troponin I   Result Value Ref Range    Troponin I ES 0.049 (H) 0.000 - 0.045 ug/L   Blood component   Result Value Ref Range    Unit Number [id number]     Blood Component Type Red Blood Cells Leukocyte Reduced     Division Number 00     Status of Unit Ready for patient 03/27/2018 1224     Blood Product Code [id number]     Unit Status ANNEL    EKG 12-lead, tracing only   Result Value Ref Range    Interpretation ECG Click View Image link to view waveform and result    CT Abd Aorta Bilataeral Iliofem Angio    Narrative    CTA ANGIOGRAM ABDOMINAL AORTA BILATERAL ILIOFEMORAL RUNOFF  3/27/2018  12:03 PM     HISTORY: GI bleed.    COMPARISON: None.    TECHNIQUE: CT angiogram of the abdomen and pelvis with bilateral lower  extremity runoff was performed following the administration of 100 mL  contrast. Images are viewed in multiple planes. Radiation dose for  this scan was reduced using automated exposure control, adjustment of  the mA and/or kV according to patient size, or iterative  reconstruction technique.    FINDINGS: No active extravasation of contrast and bowel is detected.    There is a 4.8 x 4.9 cm infrarenal abdominal aortic aneurysm. There is  a 2.9 cm left common iliac artery aneurysm and a 2.1 cm right common  iliac artery aneurysm.    The celiac trunk, superior mesenteric artery, and inferior mesenteric  artery are patent without significant stenoses. There are 2 right  renal arteries. The dominant more inferior right renal artery has a  significant proximal stenosis. There is a question of a focal  dissection at the origin  of the left renal artery which contributes to  a moderate stenosis.    Right lower extremity angiogram:  Common femoral artery: No significant stenosis.  Profunda femoris artery: No significant stenosis.  Superficial femoral artery: No significant stenosis.  Popliteal artery: No significant stenosis.  Tibial arteries difficult to evaluate due to dilution of contrast.    Left lower extremity angiogram:  Common femoral artery: No significant stenosis.  Profunda femoris artery: No significant stenosis.  Superficial femoral artery: No significant stenosis.  Popliteal artery: No significant stenosis.  Tibial arteries: Three-vessel runoff.    Soft tissues: The liver has a nodular contour suggesting cirrhosis.  There are a few calcified granulomas in the liver. There are calcified  granulomas in the spleen. The spleen is enlarged and measures 17 cm in  length. There are multiple cysts within the kidneys. Remaining solid  organs in the abdomen appear unremarkable.    There are scattered colonic diverticuli. No evidence for acute  diverticulitis. The bowel otherwise appears grossly unremarkable.      Impression    IMPRESSION:  1. No evidence for active gastrointestinal bleeding.  2. 4.9 cm infrarenal abdominal aortic aneurysm. 2.9 cm left common  iliac artery aneurysm and 2.1 cm right common iliac artery aneurysm.  3. Stenoses involving the proximal renal arteries as above.  4. Anatomical changes of the liver suggesting cirrhosis.  5. Changes of old granulomatous disease in the liver and spleen.  6. Splenomegaly.   Blood component   Result Value Ref Range    Unit Number [id number]     Blood Component Type Apheresis Plasma Thawed     Division Number 00     Status of Unit Ready for patient 03/27/2018 1249     Blood Product Code [id number]     Unit Status ANNEL    XR Chest Port 1 View    Narrative    XR CHEST PORT 1 VW 3/27/2018 12:42 PM    HISTORY: Post intubation.    COMPARISON: None.    FINDINGS: Endotracheal tube tip appears  appropriately positioned,  approximately 5 cm above the halle. Lungs are clear.      Impression    IMPRESSION: Endotracheal tube appears appropriately positioned.    SARAH TOWNSEND MD

## 2018-03-27 NOTE — IP AVS SNAPSHOT
MRN:[id number]                      After Visit Summary   3/27/2018    Juan Quiroz    MRN: [id number]           Thank you!     Thank you for choosing [facility] for your care. Our goal is always to provide you with excellent care. Hearing back from our patients is one way we can continue to improve our services. Please take a few minutes to complete the written survey that you may receive in the mail after you visit with us. Thank you!        Patient Information     Date Of Birth          1945        Designated Caregiver       Most Recent Value    Caregiver    Will someone help with your care after discharge? yes    Name of designated caregiver Shelbi-wife    Phone number of caregiver same as patient     Caregiver address same as patient      About your hospital stay     You were admitted on:  March 27, 2018 You last received care in the:  [facility] [address] [facility]    You were discharged on:  March 31, 2018        Reason for your hospital stay       GI bleed                  Who to Call     For medical emergencies, please call 911.  For non-urgent questions about your medical care, please call your primary care provider or clinic, None  For questions related to your surgery, please call your surgery clinic        Attending Provider     Provider Specialty    Clarence Montalvo MD Emergency Medicine    Talia, Ranjeet HARTMANN MD Critical Care Medicine    Chinedu Barahona MD Critical Care Medicine    Prachi, Amina Caldwell MD Internal Medicine       Primary Care Provider Fax #    Physician No Ref-Primary [phone]      After Care Instructions     Activity       Your activity upon discharge: activity as tolerated            Diet       Follow this diet upon discharge: Orders Placed This Encounter      Room Service      Low Fiber Diet            Monitor and record       blood glucose 4 times a day, before meals and at bedtime. Readings to PCP for any medication  "adjustment                  Follow-up Appointments     Follow-up and recommended labs and tests        Follow up with primary care provider,  within 7 days for hospital follow- up.    The following labs/tests are recommended: CBC, BMP    Follow up with Gastroenterologist  next available                  Pending Results     No orders found from 3/25/2018 to 3/28/2018.            Statement of Approval     Ordered          18 1035  I have reviewed and agree with all the recommendations and orders detailed in this document.  EFFECTIVE NOW     Comments:  Discharge once cleared by GI   Approved and electronically signed by:  Renetta Ramirez MD             Admission Information     Date & Time Provider Department Dept. Phone    3/27/2018 Amina Velarde MD [facility] [address] [facility] [phone]      Your Vitals Were     Blood Pressure Pulse Temperature Respirations Height Weight    111/51 (BP Location: Left arm) 57 98.5  F (36.9  C) (Oral) 18 1.753 m (5' 9\") 83.5 kg (184 lb 1.4 oz)    Pulse Oximetry BMI (Body Mass Index)                98% 27.18 kg/m2          [employer] Information     [employer] lets you send messages to your doctor, view your test results, renew your prescriptions, schedule appointments and more. To sign up, go to [email or web address] . Click on \"Log in\" on the left side of the screen, which will take you to the Welcome page. Then click on \"Sign up Now\" on the right side of the page.     You will be asked to enter the access code listed below, as well as some personal information. Please follow the directions to create your username and password.     Your access code is: [email or web address]  Expires: 2018  1:46 PM     Your access code will  in 90 days. If you need help or a new code, please call your [place] clinic or [phone].        Care EveryWhere ID     This is your Care EveryWhere ID. This could be used by other organizations to access your [place] medical " records  [id number]        Equal Access to Services     GINNY RITESH : Hadii adria leblanc sarojgwyn Sotorey, waaxda luqadaha, qaybta kamariamasandor liu, mounika bonillaelmahnaz alejo. So [facility] [phone].    ATENCIÓN: Si habla español, tiene a campos disposición servicios gratuitos de asistencia lingüística. Llame al [phone].    We comply with applicable federal civil rights laws and [place] laws. We do not discriminate on the basis of race, color, national origin, age, disability, sex, sexual orientation, or gender identity.               Review of your medicines      START taking        Dose / Directions    * insulin aspart 100 UNIT/ML injection   Commonly known as:  NovoLOG PEN   Used for:  Type 2 diabetes mellitus with complication, unspecified long term insulin use status (H)        Dose:  1-7 Units   Inject 1-7 Units Subcutaneous 3 times daily (before meals)   Quantity:  9 mL   Refills:  0       * insulin aspart 100 UNIT/ML injection   Commonly known as:  NovoLOG PEN   Used for:  Type 2 diabetes mellitus with complication, unspecified long term insulin use status (H)        Dose:  1-5 Units   Inject 1-5 Units Subcutaneous At Bedtime   Refills:  0       lactulose 10 GM/15ML solution   Commonly known as:  CHRONULAC   Used for:  Bleeding esophageal varices, unspecified esophageal varices type (H)        Dose:  10 g   Take 15 mLs (10 g) by mouth 2 times daily   Quantity:  500 mL   Refills:  0       nadolol 20 MG tablet   Commonly known as:  CORGARD   Used for:  Gastrointestinal hemorrhage, unspecified gastrointestinal hemorrhage type        Dose:  20 mg   Start taking on:  4/1/2018   Take 1 tablet (20 mg) by mouth daily   Quantity:  30 tablet   Refills:  0       pantoprazole 40 MG EC tablet   Commonly known as:  PROTONIX   Used for:  Gastrointestinal hemorrhage, unspecified gastrointestinal hemorrhage type   Notes to Patient:  Works best on an empty stomach        Dose:  40 mg   Start taking on:   4/1/2018   Take 1 tablet (40 mg) by mouth every morning   Quantity:  30 tablet   Refills:  0       * Notice:  This list has 2 medication(s) that are the same as other medications prescribed for you. Read the directions carefully, and ask your doctor or other care provider to review them with you.      CONTINUE these medicines which have NOT CHANGED        Dose / Directions    FLOMAX 0.4 MG capsule   Generic drug:  tamsulosin        Dose:  0.4 mg   Take 0.4 mg by mouth daily   Refills:  0       METFORMIN HCL PO        Dose:  1000 mg   Take 1,000 mg by mouth 2 times daily   Refills:  0         STOP taking     CELEBREX PO           GLIPIZIDE XL PO                Where to get your medicines      These medications were sent to [facility] [place], [place] - [address]  [address] [facility], [place] [address]     Phone:  [phone]     insulin aspart 100 UNIT/ML injection    lactulose 10 GM/15ML solution    nadolol 20 MG tablet    pantoprazole 40 MG EC tablet         Some of these will need a paper prescription and others can be bought over the counter. Ask your nurse if you have questions.     You don't need a prescription for these medications     insulin aspart 100 UNIT/ML injection                Protect others around you: Learn how to safely use, store and throw away your medicines at www.disposemymeds.org.             Medication List: This is a list of all your medications and when to take them. Check marks below indicate your daily home schedule. Keep this list as a reference.      Medications           Morning Afternoon Evening Bedtime As Needed    FLOMAX 0.4 MG capsule   Take 0.4 mg by mouth daily   Last time this was given:  0.4 mg on 3/30/2018  5:03 PM   Generic drug:  tamsulosin   Next Dose Due:  Take tonight, Saturday 3/31/18                                   * insulin aspart 100 UNIT/ML injection   Commonly known as:  NovoLOG PEN   Inject 1-7 Units Subcutaneous 3 times daily (before  meals)   Last time this was given:  2 Units on 3/31/2018  8:35 AM   Next Dose Due:  Take today with before meals, Saturday 3/31/18                                         * insulin aspart 100 UNIT/ML injection   Commonly known as:  NovoLOG PEN   Inject 1-5 Units Subcutaneous At Bedtime   Last time this was given:  2 Units on 3/31/2018  8:35 AM   Next Dose Due:  Take tonight before bedtime, Saturday 3/31/18                                   lactulose 10 GM/15ML solution   Commonly known as:  CHRONULAC   Take 15 mLs (10 g) by mouth 2 times daily   Last time this was given:  10 g on 3/31/2018  8:34 AM   Next Dose Due:  Take tonight, Saturday 3/31/18                                      METFORMIN HCL PO   Take 1,000 mg by mouth 2 times daily   Next Dose Due:  Take tonight, Saturday 3/31/18                                      nadolol 20 MG tablet   Commonly known as:  CORGARD   Take 1 tablet (20 mg) by mouth daily   Start taking on:  4/1/2018   Last time this was given:  20 mg on 3/31/2018  8:35 AM   Next Dose Due:  Take tomorrow, Sunday 04/01/18                                   pantoprazole 40 MG EC tablet   Commonly known as:  PROTONIX   Take 1 tablet (40 mg) by mouth every morning   Start taking on:  4/1/2018   Last time this was given:  40 mg on 3/31/2018  8:35 AM   Next Dose Due:  Take tomorrow, Sunday 04/01/18   Notes to Patient:  Works best on an empty stomach                                   * Notice:  This list has 2 medication(s) that are the same as other medications prescribed for you. Read the directions carefully, and ask your doctor or other care provider to review them with you.              More Information      Rapid-Acting Insulin  Aspart (AS-part), Lispro (LYE-spro) and Glulisine (MGCR-wau-zdx)  Brand names: NovoLog, Humalog, Apidra  What does it do?  Rapid-acting insulin helps your body maintain normal levels of blood sugar (glucose.)  This is a fast-acting, or bolus, insulin. It starts working in  10 to 20 minutes. It lasts for 3 to 4 hours.  Rapid-acting insulin is used to:    Cover the carbs you eat and drink, and    Correct out-of-range glucose levels.  How do I take it?    You take this insulin as a shot under the skin of your upper arms, upper legs or belly (abdomen).    Rotate the shot site within the area you choose. For example, if you inject in your belly, pick a different spot on your belly each time.    Your doctor will tell you the dose and how often to inject it.    Take the shot before you eat a meal or snack.  This insulin comes in vials, pens or cartridges.     Don't shake it.    Don't use it if insulin looks thick, cloudy, a different color, or has tiny specks in it.  If you miss a dose: Check your glucose and use your correction scale. This will tell you how much insulin to take.  What are the possible side effects?    You may have redness, swelling, pain, itching, burning or a lump where you got the shot.    The fatty tissue under the skin may shrink or thicken. You can prevent this by changing the injection site. Don't inject into skin that has shrunk or thickened.    You may have low blood glucose (less than 70). Signs of low blood glucose include:    Sweating    Feeling shaky    Sudden hunger    Fast heartbeat    Feeling dizzy, confused or weak    Headache or problems seeing    Feeling irritable.  How should I store my insulin?  This insulin is good for 28 days after you open it. Store opened insulin at room temperature. Keep it away from sunlight and heat.  Store unopened insulin in the refrigerator. Don't freeze it.  Keep medicine out of the reach of children and pets.  When should I call my diabetes care team?  You should call the doctor right away if you notice any of the following:    Your blood glucose is often too high or too low.    You feel sick to your stomach (nauseous), or you throw up (vomit).    You get flu-like symptoms, such as fever or chills.    You have any sign of an  allergic reaction, such as:    Hives or itchy skin    Swelling in the face, hands, mouth or throat    Tingling in the mouth or throat    A tight feeling in the chest    Trouble breathing, wheezing  What else should I know before taking this medicine?    Before taking this medicine, tell your doctor:    About all medicines you are taking, including over-the-counter drugs and herbal products    If you have kidney or liver disease    If you are pregnant, or planning to get pregnant    If you are breastfeeding or planning on breastfeeding    Always carry some form of carbohydrate with you.    Your dose may need to change when you:    Are sick,    Change your diet, or    Become more or less active.    Alcohol may cause symptoms that feel like low blood glucose. Check your blood glucose often when drinking wine, beer or spirits.    Don't change brands of insulin without talking to your doctor.  For informational purposes only. Not to replace the advice of your health care provider.Copyright   2008 [employer] All rights reserved. [employer] [id number] - 03/17          Resources for People with Diabetes  Diabetes websites and resources  For more information about support groups and other resources in your area, contact your diabetes educator or national diabetes groups, such as the American Diabetes Association.  General diabetes  American Diabetes Association   1-286.365.8188; www.diabetes.org  (general information on diabetes, gestational diabetes or diabetes in children)  National Center for Chronic Disease Prevention and Health Promotion  1-297.568.1543 (CDC-INFO);   www.cdc.gov/diabetes  Medline Plus  www.nlm.nih.gov/medlineplus/diabetes.htm  National Diabetes Information Clearinghouse  1-690.649.2874; www.diabetes.niddk.nih.gov  National Diabetes Education Program  1-419.729.5526; www.ndep.nih.gov  American Association of Diabetes Educators   www.diabeteseducator.org (find an educator near you)  Diabetes  and nutrition  My Food Advisor  http://diabetes.org/food-and-fitness/food/myfoodadvisor.htm  Academy of Nutrition and Dietetics  www.eatright.org  [place]irum Library  www.nutrition.gov  Choose My Plate, Echobit  www.[place] Studiomyplate.gov  Diabetes and nutrition apps    Calorie [place]e    MyFitness Pal    Glucose Dashawn    AADE Goal Tracker  Gestational diabetes  March of Dimes  www.Shweeb/pnc/188_1025.asp  National [place] of Diabetes and   Digestive and Kidney Disease  1-172.736.6165;   www.diabetes.niddk.nih.gov/dm/pubs/gestational   Children with diabetes  Juvenile Diabetes Research Foundation International  www.jdrf.org  Children with Diabetes Family Support Network  www.childrenwithdiabetes.com  My Plate Kids' Place, USDA    www.[place] Studiomyplate.gov/kids  American Diabetes Association  www.diabetes.org/in-my-community/diabetes-camp  (camps for children with diabetes)   Diabetes and cystic fibrosis  Cystic Fibrosis Foundation  www.cff.org  (diabetes manual available)  [facility] diabetes education programs  [facility] offers a number of resources to help you manage your diabetes, including diabetes classes and pharmacist support.  For children (from any hospital or clinic)  [facility]  [phone]  For adults  [facility]    [facility]: [phone]    [place]: [phone]  [facility]  [phone]  [facility]   [phone]  [facility] [place] [facility]  [phone]  [facility] pharmacists  Your pharmacist can answer many of your questions about your medicines. To schedule an appointment with a [facility] pharmacist, call [phone] (toll free: [phone]).  For a complete list of [facility] pharmacies and contact information, go to [email or web address].  Additional [facility] services  [facility] also offers the following services to support your  diabetes care.     Behavioral Health    Cardiovascular and Heart Care    Care Coordination    Eye Care    Foot Care    Kidney Care    Neurology    Nutrition Care    Physical Therapy and Diabetes Activity Program    Weight Management  Ask your provider or diabetes educator if you'd like more information.  For informational purposes only. Not to replace the advice of your health care provider.   Copyright   2007 [facility]. All rights reserved. "[employer]" [id number] - REV 08/16.          Travel and Diabetes  Type 1 and Type 2  How can I get ready for travel?  Traveling with diabetes means planning ahead. Here are a few tips:    See your doctor about a month before you leave. You may need a check-up and shots if you are leaving the country.    Ask your doctor to sign a letter explaining your treatment plan and medicines. You should also get extra prescriptions for:    diabetes medicine and insulin, if you take it    blood glucose meter and testing supplies    insulin supplies, if needed    medicines for nausea and vomiting.    Pack an extra week's worth of medicines and supplies for your trip. See the Packing List for Travelers below.    Tell the people you are traveling with about your diabetes and treatment plan.    Find out how and where to get medical care where you will be traveling.  What should I keep with me during travel?  Carry all of your diabetes supplies with you, especially your medicines. Use a carry-on bag for air travel. Never pack supplies in your checked bags.  Set aside a few days' worth of supplies in another place--in a handbag or with a travel partner--in case the items you carry are lost or stolen.  Besides your diabetes supplies:    Always carry some form of carbohydrate to treat low blood glucose (hypoglycemia).    Carry some food and snacks in case a flight is cancelled, meals are delayed or places that sell food are closed.    Carry your doctor's name, phone number and fax number,  along with your own address and phone number.    Bring your medical insurance card and an emergency contact.    Make sure you have a medical identification (ID) bracelet or necklace and a wallet card with you at all times.  Can I take needles, lancets, insulin and other medicines on an airplane?  Yes, but you will need to do the following:    Keep your insulin and other medicines in their original packages. Each package must have a printed prescription label.    Make sure your glucose meter has the brand name on it.  How can I manage diabetes away from home?    Protect your feet. Wear shoes that fit well, and take at least two pairs of shoes with you. Make sure your shoes have been broken in and are comfortable. Bring sandals, flip-flops or swim shoes for the beach and hotel room.    Protect your skin from sunburn. Bring sunscreen and wear it. Remember that the tops of your feet and head can burn, too. Cover your feet and wear a hat.    Protect your eyes with sunglasses.    Plan to rest and exercise properly during your trip. No matter how you travel, stretch and walk around every one to two hours.    Be ready to treat low blood glucose if you are more active than usual on your trip. Carry a carbohydrate with you at all times, and tell a travel partner how to help in case of an emergency. You may need more food or less insulin if you are more active than normal.    Check your blood glucose often. Because you are changing your routine, you will need to check your blood glucose more often than usual. Throw away any used needles and lancets in a safe manner.  What about insulin?  If you take insulin and you will cross a time zone during your trip, talk with your diabetes care team at least two weeks before you leave. Ask them how to adjust your treatment plan.  If you wear an insulin pump, change the time on your pump to the local time. You will need to adjust your insulin as your meal times, sleep times and blood  "glucose levels change.  Packing List for Travelers    Written prescriptions for insulin, medicines and other supplies    Blood glucose testing supplies    Insulin, insulin supplies and glucagon    A \"poke-proof\" sharps container    Medicines    Foods that contain carbohydrate    Phone numbers for local clinics and hospitals that can provide medical care, if you need it while traveling    Your doctor's name, phone number and fax number    Your own address and phone number, as well as an emergency name, address and phone number    Medical ID    Your medical insurance card    Two pairs of shoes, plus sandals    Sunglasses and sunscreen  For informational purposes only. Not to replace the advice of your health care provider.  Copyright   2005 [facility]. All rights reserved. [employer] [id number] - REV 03/16.            When You Have Gastrointestinal (GI) Bleeding    Blood in your vomit or stool can be a sign of gastrointestinal (GI) bleeding. GI bleeding can be scary. But the cause may not be serious. You should always see a doctor if GI bleeding occurs.  The GI tract  The GI tract is the path through which food travels in the body. Food passes from the mouth down the esophagus (the tube from the mouth to the stomach). Food begins to break down in the stomach. It then moves through the duodenum, the first part of the small intestine. Nutrients are absorbed as food travels through the small intestine. What is left passes into the colon (large intestine) as waste. The colon removes water from the waste. Waste continues from the colon to the rectum (where stool is stored). Waste then leaves the body through the anus.  Causes of GI bleeding  GI bleeding can be caused by many different problems. Some of the more common causes include:    Swollen veins in the anus (hemorrhoids)    Swollen veins in the esophagus (varices)    Sore on the lining of the GI tract (ulcer)    Cuts or scrapes in the mouth or " throat    Infection caused by germs such as bacteria or parasites    Food allergies, such as milk allergy in young children    Medicines    Inflammation of the GI tract (gastritis or esophagitis)    Colitis (Crohn's disease or ulcerative colitis)    Cancer (tumors or polyps)    Abnormal pouches in the colon (diverticula)    Tears in the esophagus or anus    Nosebleed    Abnormal blood vessels in the GI tract (angiodysplasia)  Diagnosing the cause of blood in stool  If blood is coming out in your stool, you may have a lower GI tract problem or a very fast upper GI tract bleed. Bleeding from the GI tract can be bright red. Or it may look dark and tarry. Tests may also find blood in your stool that can t be seen with the eye (occult blood). To find out the cause, tests that may be ordered include:    Blood tests. A blood sample is taken and sent to a lab for exam.    Hemoccult test. Checks a stool sample for blood.    Stool culture. Checks a stool sample for bacteria or parasites.    X-ray, ultrasound, or CT scan. Imaging tests that take pictures of the digestive tract.    Colonoscopy or sigmoidoscopy. This test uses a flexible tube with a tiny camera. The tube is inserted through your anus into your rectum to see the inside of your colon. Your provider can also take a tiny tissue sample (biopsy) and treat a bleeding source  Diagnosing the cause of blood in vomit  If you are vomiting blood or something that looks like coffee grounds, you may have an upper GI tract problem. To find the cause, tests that may be done include:    Upper Endoscopy. A flexible tube with a tiny camera is inserted through your mouth and throat to see inside your upper GI tract. This lets your provider take a tiny tissue sample (biopsy) and treat a bleeding source.    Nasogastric lavage. This can tell if you have upper GI or lower GI bleeding.    X-ray, ultrasound, or CT scan. Imaging tests that take pictures of your digestive tract.    Upper GI  series. X-rays of the upper part of your GI tract taken from inside your body.    Enteroscopy. This sends a flexible tube or a small, swallowed capsule camera into your small intestine.  When to call your healthcare provider  Call your healthcare provider right away if you have any of the following:    Bleeding from your mouth or anus that can't be stopped    Fever of 100.4 F (38.0 ) or higher    Bleeding along with feeling lightheaded or dizzy    Signs of fluid loss (dehydration). These include a dry, sticky mouth, decreased urine output; and very dark urine.    Belly (abdominal) pain   Date Last Reviewed: 7/1/2016 2000-2017 [employer]. [address] [place] [address], [place] [address]. All rights reserved. This information is not intended as a substitute for professional medical care. Always follow your healthcare professional's instructions.                Using an Injection Pen  Your healthcare provider has prescribed a medicine that you can give yourself using an injection pen. One medicine that is often given with an injection pen is insulin. Injection pens are popular because they are easy to use. Also many people feel more comfortable using something that looks like a pen instead of a syringe. Some kinds of pens you can throw away (disposable). Others should not be thrown away (nondisposable).  Disposable pens come already filled with a set amount of medicine. Once you inject the medicine you throw the pen away. With nondisposable pens, you replace the medicine jasmine (cartridge) when it is empty.  Both types of pens use a pen needle. This is screwed onto the tip of the pen before you use it. Pen needles come in different lengths and thicknesses.  Home care  Follow these steps when using the injection pen. First, get these supplies together:    Alcohol swabs    Injector pen    Cartridge, if the pen is nondisposable    Special container for the used needles and disposable pens (sharps container).  You can buy a sharps container at a pharmacy or medical supply store. You can also use an empty laundry detergent bottle, or any other puncture-proof container and lid.  Then get the pen ready:    Wash your hands.    Remove the pen cap.    Check the medicine to make sure it is the type your healthcare provider prescribed. Make sure that it has not , and is not discolored, frosted, or lumpy. If the medicine doesn't look right to you, don t use it. Get a new cartridge or a new disposable pen. Never share injection pens or medicine cartridges.    Attach a needle to your pen. Read the directions that came with your pen. They contain steps for attaching a needle. If you re using a nondisposable pen, don t leave the needle attached to the pen between shots.    Mix the medicine by rolling the pen between the palms of your hands about 20 times. You can also tip the pen back and forth.  Prime your pen and make sure that it is working by doing a test shot into the air. Do this before your actually inject the medicine. Then set the dose.    Dial the pen to deliver 2 or 3 units of medicine.    Hold the pen like a pencil, with the needle pointing up.    Tap the barrel of the pen. This will make sure any air bubbles in the cartridge float to the top of the cartridge.    Push down firmly on the pen's injector button. This will shoot medicine into the air. You should see a couple of drops of medicine come out of the needle. If nothing comes out, try doing another air shot. If medicine still doesn't come out after a second try, your pen may be low on medicine. Or the needle may not be connected properly. Read the troubleshooting tips in the directions that came with your pen.    Set your dose. Dial the pen to deliver the amount of medicine you need to take. As you turn the dial, you should hear a clicking sound. Your pen is now ready to use.    Choose an injection site. The belly (abdomen), upper arms, thighs, and buttocks  are the most common sites to use. Stay away from sites that are close to a mole or scar, or that are closer than 2 inches to your belly button.    Make sure the site is clean. Clean it with an alcohol swab. Let it dry.    Pinch up a fold of skin around the site you have selected. Hold it firmly with one hand.    Hold the injection pen like a pencil in your other hand.  Inject your medicine  Insert the needle into your pinched-up skin. The needle should be straight up and down. Thin adults or children may need to inject with the needle slightly to the left or right.    Make sure the needle gets all the way into the fatty tissue below the skin.    Push the pen injection button. Unless you take a very small dose, the injection should take a couple of seconds.    Let go of the skin and remove the needle from your skin.  After the injection    For a nondisposable pen, remove the needle by unscrewing it.    Put any used needles and disposable pens in the sharps container. Make sure that needles point down. Never put your fingers into the container.    When the sharps container is full, take it back to your healthcare facility. The staff will get rid of it for you.  Follow-up care  Follow up with your healthcare provider, or as advised.  When to seek medical advice   Call your healthcare provider right away if any of these occur:    Problems that keep you from giving your injection    Bleeding at the injection site for more than 10 minutes    Pain at the injection site that does not go away    You inject the medicine in the wrong area    You inject too much of the medicine    Rash at the injection site    Fever of 100.4 F (38 C) or higher    Redness, warmth, swelling, or drainage at the injection site    Signs of allergic reaction. These include trouble breathing, hives, or a rash.  Date Last Reviewed: 6/1/2016 2000-2017 [employer]. [address] [place] [address], [place] [address]. All rights reserved. This  information is not intended as a substitute for professional medical care. Always follow your healthcare professional's instructions.

## 2018-03-27 NOTE — PROVIDER NOTIFICATION
Notified Dr. Barahona- K+ 5.9. Discussed w/ MD and pharmacy: 6units regular insulin given SQ per SSI order. Will hold off on hyperkalemia protocol as previously ordered and reevaluate w/ serum k+ recheck. Continue to monitor closely.  Marie Farrell RN

## 2018-03-27 NOTE — ED PROVIDER NOTES
History     Chief Complaint:  Hematemesis and Melena     HPI   Juan Quiroz is a 72 year old male with a history of diabetes and known abdominal aortic aneurysm (4 cm on US from January 2018 per patient report) who presents via EMS for evaluation of hematemesis and melena. The patient lives in [place] and was on a plane ride from [place] to [place] this morning. He woke up at 3am to get on his plane and reports he was feeling nauseous when he woke up. He had three episodes of large bright red bloody emesis on the plane starting at 6am this morning. He was taken off the plane and brought to the ED by EMS for evaluation. He did have one episode of black stool on Sunday, 2 days ago, as well. EMS reports one hypotensive reading down to 84 in route, otherwise vitals were stable. Blood sugar 341. He was given 4 mg Zofran in route. EMS brought in two large bags of bright red bloody emesis along with the patient. On arrival, the patient denies any abdominal pain. He denies any chest pain, shortness of breath, generalized weakness, or any other pain. He has never had anything like this before. He denies regular Aspirin or NSAID use. He is not anticoagulated. He denies any alcohol use. He is a smoker. The patient denies any recent falls or trauma.      Allergies:  Prednisone      Medications:    Unknown    Past Medical History:    Abdominal aortic aneurysm  Type 2 diabetes  Renal mass  Remote history of pancreatitis    Past Surgical History:    Trigeminal neuralgia surgery  Cataract    Family History:    History reviewed. No pertinent family history.     Social History:  Smoking status: Yes  Alcohol use: No  Presents to the ED with his wife  Patient is a      Review of Systems   Respiratory: Negative for shortness of breath.    Cardiovascular: Negative for chest pain.   Gastrointestinal: Positive for blood in stool and vomiting. Negative for abdominal pain.   Neurological: Negative for dizziness, weakness and  "light-headedness.   All other systems reviewed and are negative.      Physical Exam   Patient Vitals for the past 24 hrs:   BP Temp Heart Rate Resp SpO2 Height Weight   03/27/18 1330 147/69 - 115 (!) 0 99 % - -   03/27/18 1315 134/55 - 108 16 99 % - -   03/27/18 1300 125/63 - 96 23 100 % - -   03/27/18 1245 120/63 - 98 27 98 % - -   03/27/18 1230 155/87 - 104 20 99 % - -   03/27/18 1215 (!) 226/118 - 112 16 100 % - -   03/27/18 1205 106/48 - 72 10 100 % - -   03/27/18 1200 104/45 - 76 (!) 7 99 % - -   03/27/18 1158 (!) 88/47 - 74 13 - - -   03/27/18 1157 103/45 - - - - - -   03/27/18 1137 105/51 - 78 11 100 % - -   03/27/18 1130 - - 84 26 100 % - -   03/27/18 1122 112/53 98  F (36.7  C) 82 20 99 % 1.753 m (5' 9\") 81.6 kg (180 lb)   03/27/18 1116 108/51 - - - 99 % - -   03/27/18 1115 (!) 86/60 - - - - - -       Physical Exam  Nursing note and vitals reviewed.  Constitutional:  Oriented to person, place, and time. Cooperative. Dry bloody emesis on face.   HENT:   Nose:    Nose normal.   Mouth/Throat:   Mucous membranes are normal.   Eyes:    Conjunctivae normal and EOM are normal.      Pupils are equal, round, and reactive to light.   Neck:    Trachea normal.   Cardiovascular:  Normal rate, regular rhythm, normal heart sounds and normal pulses. No murmur heard.  Pulmonary/Chest:  Effort normal and breath sounds normal.   Abdominal:   Soft. Normal appearance and bowel sounds are normal.      There is no tenderness.      There is no rebound and no CVA tenderness.   Rectal:   Melena present.   Musculoskeletal:  Extremities atraumatic x 4.   Lymphadenopathy:  No cervical adenopathy.   Neurological:   Alert and oriented to person, place, and time. Normal strength.      No cranial nerve deficit or sensory deficit. GCS eye subscore is 4. GCS verbal subscore is 5. GCS motor subscore is 6.   Skin:    Skin is intact. No rash noted.   Psychiatric:   Normal mood and affect.    Emergency Department Course   ECG (11:26:16):  Rate " "80 bpm. TN interval 148. QRS duration 88. QT/QTc 388/447. P-R-T axes 67 62 51. Normal sinus rhythm. Nonspecific ST and T wave abnormality. Abnormal ECG.  No old ECG   Interpreted at 1135 by Clarence Montalvo MD.    Imaging:  Radiographic findings were communicated with the patient who voiced understanding of the findings.    CT Abdomen Aorta Bilateral Iliofem Angio  1. No evidence for active gastrointestinal bleeding.  2. 4.9 cm infrarenal abdominal aortic aneurysm. 2.9 cm left common  iliac artery aneurysm and 2.1 cm right common iliac artery aneurysm.  3. Stenoses involving the proximal renal arteries as above.  4. Anatomical changes of the liver suggesting cirrhosis.  5. Changes of old granulomatous disease in the liver and spleen.  6. Splenomegaly.  As read by Radiology.    XR Chest Portable 1 view  Endotracheal tube appears appropriately positioned.  As read by Radiology.    Laboratory:  Troponin: 0.049(H)  CBC: HGB 8.4(L), WBC 12.1(H), (L) WNL   CMP: Glucose 261(H), BUN 46(H), Albumin 2.9(L), Protein total 5.7(L), o/w  WNL (Creatinine 0.89)  PTT: 32  INR: 1.50(H)  ABO/Rh: B Pos    Procedures:     Intubation      INDICATION: Airway protection.    PERFORMED BY: Clarence Montalvo MD    CONSENT: Consent was obtained after discussing the risks, benefits and alternatives with the Patient.    TIMEOUT: Immediately prior to procedure a \"time out\" was called to verify the correct patient, procedure, equipment, support staff and site/side marked as required.    INTUBATION METHOD: Direct with [facility]    PATIENT STATUS: RSI    PREOXYGENATION: Mask    PRETREATMENT MEDICATIONS: None    SEDATIVES: Etomidate    PARALYTIC: succinylcholine    LARYNGOSCOPE SIZE: Mac 4    TUBE SIZE: 7.5 cuffed with cuff inflated after placement  Number of attempts: 1  Cricoid pressure: yes  Cords visualized: yes    POST-PROCEDURE ASSESSMENT: Breath sounds equal bilaterally with chest rise and absent over the epigastrium, Chest x-ray " interpreted by me demonstrating endotracheal tube in appropriate position and CO2 detector.    ETT TO LIPS: 24 cm  Tube secured with: ETT jasmine    Patient tolerated the procedure well with no immediate complications.  COMPLICATIONS:  None      PROCEDURE: Point of Care US of Abdomen, Limited  PERFORMED BY: Clarence Montalvo MD  INDICATIONS/SYMPTOM:  History of AAA, evaluate for rupture.   PROBE: Low Frequency Convex probe  BODY LOCATION: The ultrasound was performed in the abdominal region.  IMPRESSION: AAA identified, but no signs of rupture.   IMAGE DOCUMENTATION: Images were archived to the PACS system  Interventions:  1132: Pantoprazole 80 mg IV  1140: Zofran 4 mg IV  1158: NS 1L IV Bolus  1209: Etomidate 10 mg IV  1209: Succinylcholine 150 mg IV  1220: Propofol drip 5-75 mcg/kg/min IV  1253: NS 1L IV Infusion   Octreotide ordered  Rocephin 2 g IV ordered  PRBC ordered  FFP ordered     Emergency Department Course:  1111: The patient arrived in the emergency department via EMS.   On arrival, patient was brought to stabilization room 2.   Past medical records, nursing notes, and vitals reviewed.  1112: I performed an exam of the patient and obtained history, as documented above.  The patient was placed on continuous cardiac monitoring and pulse oximetry.  IV inserted and blood drawn.    ECG obtained, results above.     1131: I performed a POC bedside Abdominal US per the above procedure note.    1139: The patient was sent for a CT AP while in the emergency department, findings above.    1143: I spoke to Dr. Martinez on call for GI.     1145: Call from nurse. Patient hypotensive to the 80s in CT.     1151: Spoke to Dr. Martinez again. Plan for intubation for airway protection; Dr. Martinez planning to scope patient soon.    1157: Patient returned from CT.     1200: Rechecked patient. Discussed plan for intubation and consented patient.     1207: I performed an intubation per the above procedure note.     1211:  Dr. Martinez from GI down in the ED to see the patient.     1214: I spoke to Dr. Melgar of the intensivist service who accepts the patient for admission.     1245: Portable chest x-ray obtained, results above.     1257: Rechecked patient.      1320: Upper endoscopy was performed by Dr. Michelle boyd in the Emergency Department.     Findings and plan explained to the Patient who consents to admission.   Discussed the patient with Dr. Melgar, who will admit the patient to an ICU bed for further monitoring, evaluation, and treatment.     Impression & Plan      Medical Decision Making:  This is a 72-year-old male brought in by EMS due to an upper GI bleed.  Based on the amount of bloody emesis that he has been having, I immediately had him moved to 1 of our stabilization rooms.  I was concerned this could be a bleeding ulcer, but I also considered the possibility of a variceal bleed.  I also considered the possibility of an aorto intestinal fistula, given his history of a known AAA.  I immediately performed my own bedside ultrasound of his abdomen to look at his aorta, and that showed the AAA, but no signs of rupture.  I felt it was necessary to then obtain a CT scan to further look for any fistula.  He was also provided IV Protonix initially in case this was secondary to a bleeding ulcer.  I spoke with Dr. Martinez, the GI physician on-call immediately as well.  When his hemoglobin came back at 8.4, as well as the fact that he became hypotensive here, I felt it was appropriate to transfuse him with blood products.  I am also providing him FFP given his elevated INR, as well as the fact that they see what looks like cirrhosis on the CT scan.  Dr. Martinez requested that I intubate the patient for airway protection so that she could perform an upper endoscopy.  I therefore intubated him per the above procedure note.  Dr. Martienz then came to the ER to see the patient and performed an upper endoscopy here in the ER.  She  visualized at least two variceal bleeding regions in his esophagus and stomach.  Therefore we are also starting him on octreotide, and we will be providing him IV antibiotics as well.  I spoke with Dr. Melgar, who will be admitting the patient to the ICU.    Critical Care time:  was 35 minutes for this patient excluding procedures.    Diagnosis:    ICD-10-CM   1. Gastrointestinal hemorrhage, unspecified gastrointestinal hemorrhage type K92.2   2. Anemia due to blood loss, acute D62   3. Elevated troponin, likely demand-related ischemia R74.8   4. Bleeding esophageal and gastric varices, unspecified esophageal varices type (H) I85.01     Disposition: Admitted to the ICU under the care of Dr. Talia Sharma  3/27/2018    EMERGENCY DEPARTMENT    I, Marina Sharma, am serving as a scribe at 11:12 AM on 3/27/2018 to document services personally performed by Clarence Montalvo MD based on my observations and the provider's statements to me.        Clarence Montalvo MD  03/27/18 1521       Clarence Montalvo MD  03/27/18 1521

## 2018-03-27 NOTE — CONSULTS
Consult Date:  03/27/2018      GASTROENTEROLOGY CONSULTATION      REASON FOR CONSULTATION:  Hematemesis.      REQUESTING PHYSICIAN:  Dr. Montalvo.      HISTORY OF PRESENT ILLNESS:  Juan Quiroz is a 72-year-old male with history of recently diagnosed 4 cm aortic abdominal aneurysm who was brought to the [facility] Emergency Department for hematemesis.  He was on a flight from [facility] to [place]. Patient lives in [place].  The patient is intubated and sedated and is unable to give any history.  Per Dr. Montalvo, the patient has had melena for the past couple days.  Today, he started having vomiting, initially dark blood, then became bright red.      On presentation to [facility], he was found to have a BUN of 46, creatinine of 0.89.  Liver tests were normal.  WBC was 12.1, hemoglobin 8.4, platelet count 134.  INR 1.0.  A CT scan and ultrasound of the abdomen were done and results are pending.      The patient initially had blood pressure in the 120s; subsequently they dropped to the 80s.  Because of pending hemodynamic instability, he was intubated for airway protection.      PAST MEDICAL HISTORY:   1.  Aortic abdominal aneurysm.   2.  Otherwise, unable to obtain past medical history.      SOCIAL HISTORY:  The patient is , denies alcohol use to Dr. Montalvo.  Unable to obtain more social history due to patient being sedated.      FAMILY HISTORY:  Unable to elicit due to patient being sedated.      REVIEW OF SYSTEMS:  Unable to obtain secondary to patient's sedation status.      PHYSICAL EXAMINATION:   VITAL SIGNS:  BP is 106/48, respiratory rate 10, pulse ox 100%, heart rate 72, temperature 98.   GENERAL:  The patient is sedated, intubated, in no acute distress.   HEENT:  Pupils pinpoint bilaterally, no sclerae icterus.  Oropharynx is clear.  Mucous membranes are moist.   NECK:  Supple without lymphadenopathy.  There is no thyromegaly.  ET tube is in place.   HEART:   Tachycardic, regular rhythm.   LUNGS:  Clear to auscultation bilaterally anteriorly.   ABDOMEN:  Soft, nontender, nondistended with normoactive bowel sounds.  He has a hyperdynamic aortic pulse.   EXTREMITIES:  Warm, without lower extremity edema.  There is no clubbing or cyanosis.   DERMATOLOGIC:  Exam reveals no obvious rashes.   NEUROLOGIC:  Cranial nerves II-XII grossly intact.      ASSESSMENT:  The patient is a 72-year-old male with upper gastrointestinal bleed.  He has no known history of liver disease; however, his history of mild thrombocytopenia and mild elevation in his INR is somewhat suggestive of chronic liver disease, perhaps portal hypertension with varices.  Also, the differential is peptic ulcer disease, Danielle-Markham tear, arteriovenous malformation, etc.      PLAN:   1.  Agree with transfusion of FFP.   2.  Continue IV proton pump inhibitor.   3.  Transfusions per Hospitalist Service.   4.  EGD this afternoon.  Further management depending on the results of that study.      Thank you for allowing me to participate in the care of this patient.  Please contact me with any questions or concerns.         ARABELLA CAMACHO MD             D: 2018   T: 2018   MT: [id number]      Name:     MARQUES LYLE   MRN:      [id number]        Account:       [id number]   :      1945           Consult Date:  2018      Document: [id number]       cc: Clarence Montalvo MD

## 2018-03-27 NOTE — PROVIDER NOTIFICATION
Notified Dr. Barahona of critical lab: troponin 0.178. No new orders at this time.  Marie Farrell RN

## 2018-03-27 NOTE — PHARMACY-ADMISSION MEDICATION HISTORY
Admission medication history interview status for the 3/27/2018  admission is complete. See EPIC admission navigator for prior to admission medications     Medication history source reliability:Moderate    Actions taken by pharmacist (provider contacted, etc): Identified pills wife had in a box and called [employer].    Additional medication history information not noted on PTA med list :  There was one unidentifiable pill that think may be a vitamin - maybe vitamin D.    Medication reconciliation/reorder completed by provider prior to medication history? Yes    Time spent in this activity: 25 min    Prior to Admission medications    Medication Sig Last Dose Taking? Auth Provider   tamsulosin (FLOMAX) 0.4 MG capsule Take 0.4 mg by mouth daily Unknown Yes Unknown, Entered By History   METFORMIN HCL PO Take 1,000 mg by mouth 2 times daily  Unknown Yes Unknown, Entered By History   GLIPIZIDE XL PO Take 10 mg by mouth 2 times daily  Unknown Yes Unknown, Entered By History   Celecoxib (CELEBREX PO) Take 200 mg by mouth as needed for moderate pain prn Yes Unknown, Entered By History

## 2018-03-27 NOTE — IP AVS SNAPSHOT
[facility] [address] [facility]    [address] [place] [address]    Phone:  [phone]                                       After Visit Summary   3/27/2018    Juan Quiroz    MRN: [id number]           After Visit Summary Signature Page     I have received my discharge instructions, and my questions have been answered. I have discussed any challenges I see with this plan with the nurse or doctor.    ..........................................................................................................................................  Patient/Patient Representative Signature      ..........................................................................................................................................  Patient Representative Print Name and Relationship to Patient    ..................................................               ................................................  Date                                            Time    ..........................................................................................................................................  Reviewed by Signature/Title    ...................................................              ..............................................  Date                                                            Time

## 2018-03-28 ENCOUNTER — APPOINTMENT (OUTPATIENT)
Dept: CARDIOLOGY | Facility: CLINIC | Age: 73
DRG: 368 | End: 2018-03-28
Attending: PHYSICIAN ASSISTANT
Payer: MEDICARE

## 2018-03-28 ENCOUNTER — APPOINTMENT (OUTPATIENT)
Dept: ULTRASOUND IMAGING | Facility: CLINIC | Age: 73
DRG: 368 | End: 2018-03-28
Attending: PHYSICIAN ASSISTANT
Payer: MEDICARE

## 2018-03-28 LAB
ALBUMIN SERPL-MCNC: 2.9 G/DL (ref 3.4–5)
ALP SERPL-CCNC: 46 U/L (ref 40–150)
ALT SERPL W P-5'-P-CCNC: 32 U/L (ref 0–70)
AMMONIA PLAS-SCNC: 124 UMOL/L (ref 10–50)
ANION GAP SERPL CALCULATED.3IONS-SCNC: 10 MMOL/L (ref 3–14)
AST SERPL W P-5'-P-CCNC: 28 U/L (ref 0–45)
BASE EXCESS BLDV CALC-SCNC: 1.3 MMOL/L
BILIRUB SERPL-MCNC: 0.4 MG/DL (ref 0.2–1.3)
BLD PROD TYP BPU: NORMAL
BLD UNIT ID BPU: 0
BLD UNIT ID BPU: 0
BLOOD PRODUCT CODE: NORMAL
BLOOD PRODUCT CODE: NORMAL
BPU ID: NORMAL
BPU ID: NORMAL
BUN SERPL-MCNC: 58 MG/DL (ref 7–30)
CA-I BLD-MCNC: 4.4 MG/DL (ref 4.4–5.2)
CA-I BLD-MCNC: 4.5 MG/DL (ref 4.4–5.2)
CALCIUM SERPL-MCNC: 7.5 MG/DL (ref 8.5–10.1)
CHLORIDE SERPL-SCNC: 111 MMOL/L (ref 94–109)
CO2 SERPL-SCNC: 23 MMOL/L (ref 20–32)
CREAT SERPL-MCNC: 1.18 MG/DL (ref 0.66–1.25)
ERYTHROCYTE [DISTWIDTH] IN BLOOD BY AUTOMATED COUNT: 15.5 % (ref 10–15)
ERYTHROCYTE [DISTWIDTH] IN BLOOD BY AUTOMATED COUNT: 15.5 % (ref 10–15)
ERYTHROCYTE [DISTWIDTH] IN BLOOD BY AUTOMATED COUNT: 16 % (ref 10–15)
GFR SERPL CREATININE-BSD FRML MDRD: 60 ML/MIN/1.7M2
GLUCOSE BLDC GLUCOMTR-MCNC: 101 MG/DL (ref 70–99)
GLUCOSE BLDC GLUCOMTR-MCNC: 102 MG/DL (ref 70–99)
GLUCOSE BLDC GLUCOMTR-MCNC: 103 MG/DL (ref 70–99)
GLUCOSE BLDC GLUCOMTR-MCNC: 107 MG/DL (ref 70–99)
GLUCOSE BLDC GLUCOMTR-MCNC: 108 MG/DL (ref 70–99)
GLUCOSE BLDC GLUCOMTR-MCNC: 110 MG/DL (ref 70–99)
GLUCOSE BLDC GLUCOMTR-MCNC: 118 MG/DL (ref 70–99)
GLUCOSE BLDC GLUCOMTR-MCNC: 133 MG/DL (ref 70–99)
GLUCOSE BLDC GLUCOMTR-MCNC: 167 MG/DL (ref 70–99)
GLUCOSE BLDC GLUCOMTR-MCNC: 179 MG/DL (ref 70–99)
GLUCOSE BLDC GLUCOMTR-MCNC: 197 MG/DL (ref 70–99)
GLUCOSE BLDC GLUCOMTR-MCNC: 225 MG/DL (ref 70–99)
GLUCOSE BLDC GLUCOMTR-MCNC: 244 MG/DL (ref 70–99)
GLUCOSE BLDC GLUCOMTR-MCNC: 88 MG/DL (ref 70–99)
GLUCOSE SERPL-MCNC: 164 MG/DL (ref 70–99)
HBV CORE AB SERPL QL IA: NONREACTIVE
HBV SURFACE AB SERPL IA-ACNC: 0.18 M[IU]/ML
HBV SURFACE AG SERPL QL IA: NONREACTIVE
HCO3 BLDV-SCNC: 26 MMOL/L (ref 21–28)
HCT VFR BLD AUTO: 19.5 % (ref 40–53)
HCT VFR BLD AUTO: 20.6 % (ref 40–53)
HCT VFR BLD AUTO: 22 % (ref 40–53)
HCV AB SERPL QL IA: NONREACTIVE
HGB BLD-MCNC: 6.9 G/DL (ref 13.3–17.7)
HGB BLD-MCNC: 7.5 G/DL (ref 13.3–17.7)
HGB BLD-MCNC: 7.8 G/DL (ref 13.3–17.7)
HGB BLD-MCNC: 7.9 G/DL (ref 13.3–17.7)
INR PPP: 1.55 (ref 0.86–1.14)
LACTATE BLD-SCNC: 0.9 MMOL/L (ref 0.7–2)
LACTATE BLD-SCNC: 3.1 MMOL/L (ref 0.7–2)
MAGNESIUM SERPL-MCNC: 1.9 MG/DL (ref 1.6–2.3)
MCH RBC QN AUTO: 33.3 PG (ref 26.5–33)
MCH RBC QN AUTO: 33.3 PG (ref 26.5–33)
MCH RBC QN AUTO: 34.1 PG (ref 26.5–33)
MCHC RBC AUTO-ENTMCNC: 35.4 G/DL (ref 31.5–36.5)
MCHC RBC AUTO-ENTMCNC: 35.5 G/DL (ref 31.5–36.5)
MCHC RBC AUTO-ENTMCNC: 36.4 G/DL (ref 31.5–36.5)
MCV RBC AUTO: 94 FL (ref 78–100)
NUM BPU REQUESTED: 0
O2/TOTAL GAS SETTING VFR VENT: NORMAL %
OXYHGB MFR BLDV: 77 %
PCO2 BLDV: 42 MM HG (ref 40–50)
PH BLDV: 7.41 PH (ref 7.32–7.43)
PHOSPHATE SERPL-MCNC: 4 MG/DL (ref 2.5–4.5)
PLATELET # BLD AUTO: 107 10E9/L (ref 150–450)
PLATELET # BLD AUTO: 134 10E9/L (ref 150–450)
PLATELET # BLD AUTO: 149 10E9/L (ref 150–450)
PO2 BLDV: 43 MM HG (ref 25–47)
POTASSIUM SERPL-SCNC: 4.9 MMOL/L (ref 3.4–5.3)
PROT SERPL-MCNC: 5.2 G/DL (ref 6.8–8.8)
RBC # BLD AUTO: 2.07 10E12/L (ref 4.4–5.9)
RBC # BLD AUTO: 2.2 10E12/L (ref 4.4–5.9)
RBC # BLD AUTO: 2.34 10E12/L (ref 4.4–5.9)
SODIUM SERPL-SCNC: 144 MMOL/L (ref 133–144)
TRANSFUSION STATUS PATIENT QL: NORMAL
TROPONIN I SERPL-MCNC: 0.24 UG/L (ref 0–0.04)
WBC # BLD AUTO: 13.5 10E9/L (ref 4–11)
WBC # BLD AUTO: 19.3 10E9/L (ref 4–11)
WBC # BLD AUTO: 20.7 10E9/L (ref 4–11)

## 2018-03-28 PROCEDURE — 80053 COMPREHEN METABOLIC PANEL: CPT | Performed by: PHYSICIAN ASSISTANT

## 2018-03-28 PROCEDURE — 84100 ASSAY OF PHOSPHORUS: CPT | Performed by: PHYSICIAN ASSISTANT

## 2018-03-28 PROCEDURE — 25500064 ZZH RX 255 OP 636: Performed by: INTERNAL MEDICINE

## 2018-03-28 PROCEDURE — 83735 ASSAY OF MAGNESIUM: CPT | Performed by: PHYSICIAN ASSISTANT

## 2018-03-28 PROCEDURE — 82140 ASSAY OF AMMONIA: CPT | Performed by: PHYSICIAN ASSISTANT

## 2018-03-28 PROCEDURE — 93306 TTE W/DOPPLER COMPLETE: CPT | Mod: 26 | Performed by: INTERNAL MEDICINE

## 2018-03-28 PROCEDURE — 25000125 ZZHC RX 250: Performed by: PHYSICIAN ASSISTANT

## 2018-03-28 PROCEDURE — 93975 VASCULAR STUDY: CPT | Mod: TC

## 2018-03-28 PROCEDURE — 85610 PROTHROMBIN TIME: CPT | Performed by: PHYSICIAN ASSISTANT

## 2018-03-28 PROCEDURE — 83605 ASSAY OF LACTIC ACID: CPT | Performed by: INTERNAL MEDICINE

## 2018-03-28 PROCEDURE — 82104 ALPHA-1-ANTITRYPSIN PHENO: CPT | Performed by: INTERNAL MEDICINE

## 2018-03-28 PROCEDURE — 85018 HEMOGLOBIN: CPT | Performed by: PHYSICIAN ASSISTANT

## 2018-03-28 PROCEDURE — 85027 COMPLETE CBC AUTOMATED: CPT | Performed by: PHYSICIAN ASSISTANT

## 2018-03-28 PROCEDURE — 40000008 ZZH STATISTIC AIRWAY CARE

## 2018-03-28 PROCEDURE — 25000131 ZZH RX MED GY IP 250 OP 636 PS 637: Mod: GY | Performed by: PHYSICIAN ASSISTANT

## 2018-03-28 PROCEDURE — 20000003 ZZH R&B ICU

## 2018-03-28 PROCEDURE — 25000128 H RX IP 250 OP 636: Performed by: PHYSICIAN ASSISTANT

## 2018-03-28 PROCEDURE — 25000128 H RX IP 250 OP 636: Performed by: INTERNAL MEDICINE

## 2018-03-28 PROCEDURE — 93306 TTE W/DOPPLER COMPLETE: CPT

## 2018-03-28 PROCEDURE — 40000275 ZZH STATISTIC RCP TIME EA 10 MIN

## 2018-03-28 PROCEDURE — 83516 IMMUNOASSAY NONANTIBODY: CPT | Performed by: INTERNAL MEDICINE

## 2018-03-28 PROCEDURE — 94003 VENT MGMT INPAT SUBQ DAY: CPT

## 2018-03-28 PROCEDURE — 25000131 ZZH RX MED GY IP 250 OP 636 PS 637: Mod: GY | Performed by: INTERNAL MEDICINE

## 2018-03-28 PROCEDURE — 82103 ALPHA-1-ANTITRYPSIN TOTAL: CPT | Performed by: INTERNAL MEDICINE

## 2018-03-28 PROCEDURE — 99291 CRITICAL CARE FIRST HOUR: CPT | Performed by: PHYSICIAN ASSISTANT

## 2018-03-28 PROCEDURE — 00000146 ZZHCL STATISTIC GLUCOSE BY METER IP

## 2018-03-28 PROCEDURE — P9041 ALBUMIN (HUMAN),5%, 50ML: HCPCS | Performed by: INTERNAL MEDICINE

## 2018-03-28 PROCEDURE — 82330 ASSAY OF CALCIUM: CPT | Performed by: INTERNAL MEDICINE

## 2018-03-28 PROCEDURE — 25000128 H RX IP 250 OP 636

## 2018-03-28 PROCEDURE — 25000132 ZZH RX MED GY IP 250 OP 250 PS 637: Mod: GY | Performed by: PHYSICIAN ASSISTANT

## 2018-03-28 PROCEDURE — 85027 COMPLETE CBC AUTOMATED: CPT | Performed by: INTERNAL MEDICINE

## 2018-03-28 PROCEDURE — A9270 NON-COVERED ITEM OR SERVICE: HCPCS | Mod: GY | Performed by: PHYSICIAN ASSISTANT

## 2018-03-28 PROCEDURE — 82805 BLOOD GASES W/O2 SATURATION: CPT | Performed by: PHYSICIAN ASSISTANT

## 2018-03-28 PROCEDURE — P9016 RBC LEUKOCYTES REDUCED: HCPCS | Performed by: EMERGENCY MEDICINE

## 2018-03-28 RX ORDER — LACTULOSE 10 G/15ML
20 SOLUTION ORAL EVERY 4 HOURS
Status: DISCONTINUED | OUTPATIENT
Start: 2018-03-28 | End: 2018-03-29

## 2018-03-28 RX ORDER — ALBUMIN, HUMAN INJ 5% 5 %
25 SOLUTION INTRAVENOUS ONCE
Status: COMPLETED | OUTPATIENT
Start: 2018-03-28 | End: 2018-03-28

## 2018-03-28 RX ADMIN — CHLORHEXIDINE GLUCONATE 15 ML: 1.2 RINSE ORAL at 08:10

## 2018-03-28 RX ADMIN — HUMAN ALBUMIN MICROSPHERES AND PERFLUTREN 9 ML: 10; .22 INJECTION, SOLUTION INTRAVENOUS at 08:51

## 2018-03-28 RX ADMIN — SODIUM CHLORIDE 8 MG/HR: 9 INJECTION, SOLUTION INTRAVENOUS at 10:47

## 2018-03-28 RX ADMIN — PROPOFOL 55 MCG/KG/MIN: 10 INJECTION, EMULSION INTRAVENOUS at 11:51

## 2018-03-28 RX ADMIN — SODIUM CHLORIDE 4 UNITS/HR: 9 INJECTION, SOLUTION INTRAVENOUS at 05:12

## 2018-03-28 RX ADMIN — DEXMEDETOMIDINE HYDROCHLORIDE 0.2 MCG/KG/HR: 100 INJECTION, SOLUTION INTRAVENOUS at 13:37

## 2018-03-28 RX ADMIN — SODIUM CHLORIDE 8 MG/HR: 9 INJECTION, SOLUTION INTRAVENOUS at 21:55

## 2018-03-28 RX ADMIN — PROPOFOL 35 MCG/KG/MIN: 10 INJECTION, EMULSION INTRAVENOUS at 03:21

## 2018-03-28 RX ADMIN — LACTULOSE 20 G: 10 SOLUTION ORAL at 20:23

## 2018-03-28 RX ADMIN — OCTREOTIDE ACETATE 50 MCG/HR: 200 INJECTION, SOLUTION INTRAVENOUS; SUBCUTANEOUS at 18:25

## 2018-03-28 RX ADMIN — CEFTRIAXONE 1 G: 1 INJECTION, POWDER, FOR SOLUTION INTRAMUSCULAR; INTRAVENOUS at 13:48

## 2018-03-28 RX ADMIN — SODIUM CHLORIDE: 9 INJECTION, SOLUTION INTRAVENOUS at 11:26

## 2018-03-28 RX ADMIN — SODIUM CHLORIDE 2 UNITS/HR: 9 INJECTION, SOLUTION INTRAVENOUS at 19:23

## 2018-03-28 RX ADMIN — ALBUMIN (HUMAN) 25 G: 12.5 SOLUTION INTRAVENOUS at 01:15

## 2018-03-28 RX ADMIN — PROPOFOL 45 MCG/KG/MIN: 10 INJECTION, EMULSION INTRAVENOUS at 07:49

## 2018-03-28 NOTE — PROVIDER NOTIFICATION
Notified Chioma Bowman PA-C of critical ammonia 124. No new orders at this time.  Marie Farrell RN

## 2018-03-28 NOTE — PROGRESS NOTES
[facility] ICU RESPIRATORY NOTE  Date of Admission:3/27/18   Date of Intubation (most recent):3/27/18  Reason for Mechanical Ventilation:airway protection  Number of Days on Mechanical Ventilation:2  Met Criteria for Pressure Support Trial:Yes  Length of Pressure Support Trial:  Reason for Stopping Pressure Support Trial:  Reason for No Pressure Support Trial:Per MD    Significant Events Today:  Ventilation Mode: CMV/AC  (Continuous Mandatory Ventilation/ Assist Control)  FiO2 (%): 40 %  Rate Set (breaths/minute): 16 breaths/min  Tidal Volume Set (mL): 450 mL  PEEP (cm H2O): 5 cmH2O  Oxygen Concentration (%): 40 %  Peak Inspiratory Pressure (cm H2O) (Drager Miley): 0.2  Resp: 19        ABG Results:    Recent Labs  Lab 03/27/18  2323 03/27/18  1737   PH 7.39  --    PCO2 34*  --    PO2 163*  --    HCO3 21  --    O2PER 40 50         ETT appearance on chest x-ray:Endotracheal tube tip appropriately approximately 5 cm above the halle.    Plan: continue full vent support, and assess weaning readiness in the morning.  3/28/2018  Eriberto Jean

## 2018-03-28 NOTE — PROGRESS NOTES
Pt was extubated @1655 today and placed on aerosol mask 40%, no complication post extubation, RT will continue to monitor the pt.     Roscoe Geiger,  RT.

## 2018-03-28 NOTE — PROGRESS NOTES
[facility]    Critical Care Service  Progress Note    Date of Service (when I saw the patient): 03/28/2018     Problem List  Upper GI bleed 2/2 esophageal varices s/p banding   Newly diagnosed cirrhosis, unclear etiology  Intubation fir airway protection   Acute blood loss anemia  Thrombocytopenia   Hypotension likely 2/2 hypovolemia      Plans for Today   Wean sedation  PST and extubation if able   Trend Hgb, transfuse if needed  VBG with oxyhemoglobin   Echo    Assessment & Plan  Juan Quiroz is a 72 year old male with PMHx of recently diagnosed 4cm AAA and type II DM who presents with bright red hematemesis, intubated for airway protection. Found to have esophageal and gastric varices on EGD s/p banding x5 with new diagnosis of cirrhosis.     Neuro  1. Acute pain  2. Sedation  Plan:  -- propofol gtt for sedation, wean as able   -- may need precedex while working on extubation   -- PRN dilaudid as needed      CV  1. Hypotension, likely 2/2 hemorrhagic shock   2. AAA, recently diagnosed   3. Troponin elevation   Plan:  -- Troponin elevated 0.049-->0.17-->0.295-->0.244, now downtrending, no need to recheck   -- echo ordered today to assess function   -- CT abdomen shows AAA 4.9cm, previously recorded at ~ 4cm  -- LA normalized   -- recheck VBG with oxyhemoglobin      Resp:  1. Intubation for airway protection   Plan:  -- AC ventilation   -- CXR shows appropriately placed ETT  -- can PS and extubate once hemodynamically stable       GI/Nutrition  1. GI bleed-hematemesis with bright red blood- found to have varices on EGD s/p banding   2. Cirrhosis, newly diagnosed, unclear etiology   Plan:  -- NPO  -- PPI gtt and octreotide gtt   -- GI following   -- pending work up for cirrhosis includes alpha 1 antitrypsin, hepatitis panel, mitochondrial antibody  -- underwent EGD on 3/27 in ED, found to have 2 areas of variceal bleeding in esophagus and stomach, banded x5  -- INR elevated to 1.5, platelets  low at 134 all suggestive of cirrhosis   -- ALT, AST, alk phos, bili WNL  -- Patient has no drinking hx, no IV drug use, no recent travel      Renal/  1. No prior hx.  Baseline appears to be 0.89  2. Renal cysts bilaterally   3. Hyperchloremia   Plan:  -- Monitor Cr and UOP, Cr up to 1.18 today, UOP stable   -- keep bliss cath in place   -- CT abdomen shows multiple renal cysts, patient is being worked up for this at home      ID  1. Leukocytosis   Plan:  -- WBC downtrending   -- ceftriaxone post procedure x5 days per GI     Endocrine  1. Type II DM, hyperglycemia   Plan:  -- continue insulin gtt   -- PTA agents, glipizide and metformin   -- Keep BG  <180 for optimal healing     Heme:  1. Acute blood loss anemia  2. Thrombocytopenia   3. Coagulopathy   Plan:  -- Hgb drifting down, now 7.5  -- received 1 unit PRBCs on 3/27 and responded appropriately   -- given 5mg vit K, no response in INR (1.55)  -- continue to follow Hgb  -- Monitor hemoglobin.  -- Transfuse to keep > 7.0     General cares:  DVT Prophylaxis: Pneumatic Compression Devices  GI Prophylaxis: PPI  Restraints: Restraints for medical healing needed: YES  Family update by me today: No  Current lines are required for patient management  Access:  Peripheral IV (18 x3)  WILBERT Bowman     Time Spent on this Encounter   Billing:  I spent 45 minutes bedside and on the inpatient unit today managing the critical care of Juan Castillojenna in relation to the issues listed in this note.    Interval History   Course reviewed. No acute events. No episodes of bright red blood orally overnight. Two dark stools and one this AM. Remains on low dose levophed. No additional blood products.     Physical Exam   Temp: 99.5  F (37.5  C) Temp src: Bladder Temp  Min: 96.1  F (35.6  C)  Max: 100.4  F (38  C) BP: 136/55   Heart Rate: 80 Resp: 16 SpO2: 99 % O2 Device: Mechanical Ventilator    Vitals:    03/27/18 1122 03/27/18 1500 03/28/18 0600   Weight: 81.6 kg (180 lb)  82.7 kg (182 lb 5.1 oz) 83.5 kg (184 lb 1.4 oz)     I/O last 3 completed shifts:  In: 2635.9 [I.V.:1335.9; IV Piggyback:1000]  Out: 1310 [Urine:1310]    GEN: Course reviewed. Patient sedated on exam   EYES: PERRL, Anicteric sclera.   HEENT:  Normocephalic, atraumatic, trachea midline, ETT secure  CV: RRR, no murmurs  PULM/CHEST: Clear breath sounds bilaterally without rhonchi, crackles or wheeze  GI: normal bowel sounds, soft, non-tender, no rebound tenderness or guarding, no masses  : bliss catheter in place, urine yellow and clear  EXTREMITIES: no peripheral edema  NEURO: sedated on exam, PERRL  SKIN: warm and dry  Imaging personally reviewed: Echo pending   ECG- NSR    Data     Recent Labs  Lab 03/28/18  0400 03/28/18  0210 03/27/18  2323 03/27/18  2239 03/27/18  1953 03/27/18  1605 03/27/18  1120   WBC 19.3* 20.7*  --  25.3* 25.4* 18.6* 12.1*   HGB 7.5* 7.8*  --  8.8* 8.7* 7.9* 8.4*   MCV 94 94  --  94 96 98 98   * 149*  --  184 151 144* 134*   INR 1.55*  --   --   --   --   --  1.50*     --  140  --   --  141 141   POTASSIUM 4.9  --  5.4*  --  5.6* 5.9* 5.3   CHLORIDE 111*  --  108  --   --  108 104   CO2 23  --  20  --   --  22 23   BUN 58*  --  59*  --   --  50* 46*   CR 1.18  --  1.18  --   --  0.98 0.89   ANIONGAP 10  --  12  --   --  11 14   ROBERT 7.5*  --  7.7*  --   --  7.8* 8.9   *  --  262*  --   --  278* 261*   ALBUMIN 2.9*  --   --   --   --  2.6* 2.9*   PROTTOTAL 5.2*  --   --   --   --  5.5* 5.7*   BILITOTAL 0.4  --   --   --   --  0.4 0.9   ALKPHOS 46  --   --   --   --  65 67   ALT 32  --   --   --   --  34 33   AST 28  --   --   --   --  29 28   TROPI  --  0.244*  --   --  0.295* 0.178* 0.049*

## 2018-03-28 NOTE — PLAN OF CARE
Problem: Patient Care Overview  Goal: Plan of Care/Patient Progress Review  Outcome: Declining  Pt remains on full vent support, not appropriate for sedation vacation or PS trial at this time. Acidosis improving. Decreasing propofol for RAAS goal. hgb trending down, 1unit PRBC given. Elevated INR- vit K given. Elevated K+- SQ insulin given per SSI order, awaiting recheck. Following serial labs. Multiple LFT's sent, pending. No additional GIB at this time, continuing octreotide and protonix gtts. Levophed initiated for MAP goal, BP stable. Pt's wife updated extensively on POC at bedside, supportive.    Marie Farrell RN

## 2018-03-28 NOTE — PROGRESS NOTES
"GASTROENTEROLOGY PROGRESS NOTE    CC:     SUBJECTIVE:  Had melenic stools ON, transused 1 unit    OBJECTIVE:  General Appearance: Intubated sedated    /55 (BP Location: Left arm)  Temp 99.5  F (37.5  C) (Bladder)  Resp 16  Ht 1.753 m (5' 9\")  Wt 83.5 kg (184 lb 1.4 oz)  SpO2 99%  BMI 27.18 kg/m2  Temp (24hrs), Av.9  F (37.2  C), Min:96.1  F (35.6  C), Max:100.4  F (38  C)    Patient Vitals for the past 72 hrs:   Weight   18 0600 83.5 kg (184 lb 1.4 oz)   18 1500 82.7 kg (182 lb 5.1 oz)   18 1122 81.6 kg (180 lb)       Intake/Output Summary (Last 24 hours) at 18 0957  Last data filed at 18 0800   Gross per 24 hour   Intake          2836.98 ml   Output             1560 ml   Net          1276.98 ml       PHYSICAL EXAM    Cor: RRR  ABD:  S NT ND +bs        Additional Comments:  ROS, FH, SH: See initial GI consult for details.    I have reviewed the patient's new clinical lab results:    Recent Labs   Lab Test  18   0400  18   0210  18   2239   18   1120   WBC  19.3*  20.7*  25.3*   < >  12.1*   HGB  7.5*  7.8*  8.8*   < >  8.4*   MCV  94  94  94   < >  98   PLT  134*  149*  184   < >  134*   INR  1.55*   --    --    --   1.50*    < > = values in this interval not displayed.     Recent Labs   Lab Test  18   0400  18   2323  18   1605   POTASSIUM  4.9  5.4*  5.6*  5.9*   CHLORIDE  111*  108   --   108   CO2  23  20   --   22   BUN  58*  59*   --   50*   ANIONGAP  10  12   --   11     Recent Labs   Lab Test  18   0400  18   1605  18   1120   ALBUMIN  2.9*  2.6*  2.9*   BILITOTAL  0.4  0.4  0.9   ALT  32  34  33   AST  28  29  28         Active Problems:    Upper GI bleed, variceal    Assessment: Not obviously actively bleeding    Plan:   -Continue IV PPI gtt  -Octreotide x 72 hours  -Follow hgb, transfusions per ICU service  -Ceftriaxone x 5 days  -Consider repeat EGD if rebleeds  -Await w/u for cause " of chronic liver disease        Hanny Martinez MD  [facility]  Pager: [phone]  Office: [phone]

## 2018-03-28 NOTE — PLAN OF CARE
Problem: Patient Care Overview  Goal: Plan of Care/Patient Progress Review  Full vent settings at 30%. Levophed on at 0.07. BP labile throughout night, titrating to get MAP above 60. Lungs clear but diminished. 2 dark black stools through night, Hemoglobin 7.5. Will continue to trend. UOP adequate. Pt on 40mcg of Propofol, moves all extremities purposefully, MARYANNE. Plan for possible extubation today if stable. Will continue to monitor.

## 2018-03-28 NOTE — PROCEDURES
Procedure/Surgery Information   [facility]    Bedside Procedure Note  Date of Service (when I performed the procedure): 03/27/2018    Juan Quiroz is a 72 year old male patient.  1. Upper GI bleed    2. Gastrointestinal hemorrhage, unspecified gastrointestinal hemorrhage type    3. Anemia due to blood loss, acute    4. Elevated troponin, likely demand-related ischemia    5. Bleeding esophageal and gastric varices, unspecified esophageal varices type (H)      Past Medical History:   Diagnosis Date     Aneurysm (H)      Chronic kidney disease     MASS     Diabetes (H)      Pancreatitis      Temp: 98.8  F (37.1  C) Temp src: Bladder BP: 110/54   Heart Rate: 75 Resp: 18 SpO2: 100 % O2 Device: Mechanical Ventilator      Insert arterial line  Date/Time: 3/27/2018 8:40 PM  Performed by: SHREE DOWNING  Authorized by: SHREE DOWNING   Consent: Verbal consent obtained. Written consent obtained.  Consent given by: spouse  Patient understanding: patient states understanding of the procedure being performed  Patient consent: the patient's understanding of the procedure matches consent given  Procedure consent: procedure consent matches procedure scheduled  Relevant documents: relevant documents present and verified  Test results: test results available and properly labeled  Site marked: the operative site was marked  Imaging studies: imaging studies available  Required items: required blood products, implants, devices, and special equipment available  Patient identity confirmed: arm band  Preparation: Patient was prepped and draped in the usual sterile fashion.  Indications: multiple ABGs, respiratory failure and hemodynamic monitoring  Location: right radial  Anesthesia: local infiltration    Anesthesia:  Local Anesthetic: lidocaine 1% without epinephrine    Sedation:  Patient sedated: no  Needle gauge: 20  Seldinger technique: Seldinger technique used  Number of attempts:  1  Post-procedure: dressing applied  Post-procedure CMS: normal  Patient tolerance: Patient tolerated the procedure well with no immediate complications           Philip Guillermo

## 2018-03-29 LAB
ABO + RH BLD: NORMAL
ABO + RH BLD: NORMAL
ALBUMIN SERPL-MCNC: 2.6 G/DL (ref 3.4–5)
ALP SERPL-CCNC: 41 U/L (ref 40–150)
ALT SERPL W P-5'-P-CCNC: 30 U/L (ref 0–70)
AMMONIA PLAS-SCNC: 65 UMOL/L (ref 10–50)
ANION GAP SERPL CALCULATED.3IONS-SCNC: 7 MMOL/L (ref 3–14)
AST SERPL W P-5'-P-CCNC: 31 U/L (ref 0–45)
BASE DEFICIT BLDV-SCNC: 0.2 MMOL/L
BILIRUB SERPL-MCNC: 0.6 MG/DL (ref 0.2–1.3)
BLD GP AB SCN SERPL QL: NORMAL
BLD PROD TYP BPU: NORMAL
BLD PROD TYP BPU: NORMAL
BLD UNIT ID BPU: 0
BLOOD BANK CMNT PATIENT-IMP: NORMAL
BLOOD PRODUCT CODE: NORMAL
BPU ID: NORMAL
BUN SERPL-MCNC: 40 MG/DL (ref 7–30)
CALCIUM SERPL-MCNC: 7.5 MG/DL (ref 8.5–10.1)
CHLORIDE SERPL-SCNC: 117 MMOL/L (ref 94–109)
CO2 SERPL-SCNC: 25 MMOL/L (ref 20–32)
CREAT SERPL-MCNC: 0.95 MG/DL (ref 0.66–1.25)
ERYTHROCYTE [DISTWIDTH] IN BLOOD BY AUTOMATED COUNT: 16.6 % (ref 10–15)
GFR SERPL CREATININE-BSD FRML MDRD: 77 ML/MIN/1.7M2
GLUCOSE BLDC GLUCOMTR-MCNC: 101 MG/DL (ref 70–99)
GLUCOSE BLDC GLUCOMTR-MCNC: 109 MG/DL (ref 70–99)
GLUCOSE BLDC GLUCOMTR-MCNC: 111 MG/DL (ref 70–99)
GLUCOSE BLDC GLUCOMTR-MCNC: 114 MG/DL (ref 70–99)
GLUCOSE BLDC GLUCOMTR-MCNC: 141 MG/DL (ref 70–99)
GLUCOSE BLDC GLUCOMTR-MCNC: 143 MG/DL (ref 70–99)
GLUCOSE BLDC GLUCOMTR-MCNC: 145 MG/DL (ref 70–99)
GLUCOSE BLDC GLUCOMTR-MCNC: 150 MG/DL (ref 70–99)
GLUCOSE BLDC GLUCOMTR-MCNC: 151 MG/DL (ref 70–99)
GLUCOSE BLDC GLUCOMTR-MCNC: 174 MG/DL (ref 70–99)
GLUCOSE BLDC GLUCOMTR-MCNC: 192 MG/DL (ref 70–99)
GLUCOSE BLDC GLUCOMTR-MCNC: 88 MG/DL (ref 70–99)
GLUCOSE BLDC GLUCOMTR-MCNC: 94 MG/DL (ref 70–99)
GLUCOSE BLDC GLUCOMTR-MCNC: 99 MG/DL (ref 70–99)
GLUCOSE SERPL-MCNC: 88 MG/DL (ref 70–99)
HCO3 BLDV-SCNC: 25 MMOL/L (ref 21–28)
HCT VFR BLD AUTO: 20.5 % (ref 40–53)
HGB BLD-MCNC: 7 G/DL (ref 13.3–17.7)
HGB BLD-MCNC: 8.1 G/DL (ref 13.3–17.7)
MCH RBC QN AUTO: 32.6 PG (ref 26.5–33)
MCHC RBC AUTO-ENTMCNC: 34.1 G/DL (ref 31.5–36.5)
MCV RBC AUTO: 95 FL (ref 78–100)
MITOCHONDRIA M2 IGG SER-ACNC: 1 U/ML
NUM BPU REQUESTED: 3
OXYHGB MFR BLDV: 76 %
PCO2 BLDV: 39 MM HG (ref 40–50)
PH BLDV: 7.4 PH (ref 7.32–7.43)
PLATELET # BLD AUTO: 71 10E9/L (ref 150–450)
PO2 BLDV: 41 MM HG (ref 25–47)
POTASSIUM SERPL-SCNC: 3.7 MMOL/L (ref 3.4–5.3)
PROT SERPL-MCNC: 4.8 G/DL (ref 6.8–8.8)
RBC # BLD AUTO: 2.15 10E12/L (ref 4.4–5.9)
SODIUM SERPL-SCNC: 149 MMOL/L (ref 133–144)
SPECIMEN EXP DATE BLD: NORMAL
TRANSFUSION STATUS PATIENT QL: NORMAL
TRANSFUSION STATUS PATIENT QL: NORMAL
WBC # BLD AUTO: 7.5 10E9/L (ref 4–11)

## 2018-03-29 PROCEDURE — A9270 NON-COVERED ITEM OR SERVICE: HCPCS | Mod: GY | Performed by: PHYSICIAN ASSISTANT

## 2018-03-29 PROCEDURE — 82140 ASSAY OF AMMONIA: CPT | Performed by: PHYSICIAN ASSISTANT

## 2018-03-29 PROCEDURE — P9016 RBC LEUKOCYTES REDUCED: HCPCS | Performed by: EMERGENCY MEDICINE

## 2018-03-29 PROCEDURE — 25000128 H RX IP 250 OP 636: Performed by: PHYSICIAN ASSISTANT

## 2018-03-29 PROCEDURE — A9270 NON-COVERED ITEM OR SERVICE: HCPCS | Mod: GY | Performed by: INTERNAL MEDICINE

## 2018-03-29 PROCEDURE — 25000128 H RX IP 250 OP 636: Performed by: INTERNAL MEDICINE

## 2018-03-29 PROCEDURE — 85027 COMPLETE CBC AUTOMATED: CPT | Performed by: PHYSICIAN ASSISTANT

## 2018-03-29 PROCEDURE — 25000132 ZZH RX MED GY IP 250 OP 250 PS 637: Mod: GY | Performed by: PHYSICIAN ASSISTANT

## 2018-03-29 PROCEDURE — 25000131 ZZH RX MED GY IP 250 OP 636 PS 637: Mod: GY | Performed by: INTERNAL MEDICINE

## 2018-03-29 PROCEDURE — 85018 HEMOGLOBIN: CPT | Performed by: PHYSICIAN ASSISTANT

## 2018-03-29 PROCEDURE — 99291 CRITICAL CARE FIRST HOUR: CPT | Performed by: PHYSICIAN ASSISTANT

## 2018-03-29 PROCEDURE — 00000146 ZZHCL STATISTIC GLUCOSE BY METER IP

## 2018-03-29 PROCEDURE — 25000131 ZZH RX MED GY IP 250 OP 636 PS 637: Mod: GY | Performed by: PHYSICIAN ASSISTANT

## 2018-03-29 PROCEDURE — 25000125 ZZHC RX 250: Performed by: PHYSICIAN ASSISTANT

## 2018-03-29 PROCEDURE — 82805 BLOOD GASES W/O2 SATURATION: CPT | Performed by: PHYSICIAN ASSISTANT

## 2018-03-29 PROCEDURE — 20000003 ZZH R&B ICU

## 2018-03-29 PROCEDURE — 25000132 ZZH RX MED GY IP 250 OP 250 PS 637: Mod: GY | Performed by: INTERNAL MEDICINE

## 2018-03-29 PROCEDURE — 25000125 ZZHC RX 250: Performed by: INTERNAL MEDICINE

## 2018-03-29 PROCEDURE — 80053 COMPREHEN METABOLIC PANEL: CPT | Performed by: PHYSICIAN ASSISTANT

## 2018-03-29 RX ORDER — LACTULOSE 10 G/15ML
10 SOLUTION ORAL 3 TIMES DAILY
Status: DISCONTINUED | OUTPATIENT
Start: 2018-03-29 | End: 2018-03-30

## 2018-03-29 RX ORDER — TAMSULOSIN HYDROCHLORIDE 0.4 MG/1
0.4 CAPSULE ORAL DAILY
Status: DISCONTINUED | OUTPATIENT
Start: 2018-03-29 | End: 2018-03-31 | Stop reason: HOSPADM

## 2018-03-29 RX ADMIN — SODIUM CHLORIDE 4 UNITS/HR: 9 INJECTION, SOLUTION INTRAVENOUS at 08:48

## 2018-03-29 RX ADMIN — LIDOCAINE HYDROCHLORIDE 10 ML: 20 JELLY TOPICAL at 21:14

## 2018-03-29 RX ADMIN — DEXMEDETOMIDINE HYDROCHLORIDE 0.6 MCG/KG/HR: 100 INJECTION, SOLUTION INTRAVENOUS at 00:55

## 2018-03-29 RX ADMIN — LACTULOSE 10 G: 10 SOLUTION ORAL at 21:13

## 2018-03-29 RX ADMIN — LACTULOSE 20 G: 10 SOLUTION ORAL at 00:45

## 2018-03-29 RX ADMIN — PANTOPRAZOLE SODIUM 40 MG: 40 INJECTION, POWDER, FOR SOLUTION INTRAVENOUS at 21:13

## 2018-03-29 RX ADMIN — SODIUM CHLORIDE 8 MG/HR: 9 INJECTION, SOLUTION INTRAVENOUS at 07:56

## 2018-03-29 RX ADMIN — SODIUM CHLORIDE 2 UNITS/HR: 9 INJECTION, SOLUTION INTRAVENOUS at 13:50

## 2018-03-29 RX ADMIN — CEFTRIAXONE 1 G: 1 INJECTION, POWDER, FOR SOLUTION INTRAMUSCULAR; INTRAVENOUS at 13:31

## 2018-03-29 RX ADMIN — OCTREOTIDE ACETATE 50 MCG/HR: 200 INJECTION, SOLUTION INTRAVENOUS; SUBCUTANEOUS at 16:29

## 2018-03-29 RX ADMIN — LACTULOSE 20 G: 10 SOLUTION ORAL at 05:42

## 2018-03-29 NOTE — PROGRESS NOTES
[facility]    Critical Care Service  Progress Note    Date of Service (when I saw the patient): 03/29/2018     Problem List  Upper GI bleed 2/2 esophageal varices s/p banding   Newly diagnosed cirrhosis, unclear etiology  Intubation fir airway protection   Acute blood loss anemia  Thrombocytopenia   Hypotension likely 2/2 hypovolemia       Plans for Today   Continue serial Hgb checks   Follow up with GI on recommendations  Swallow eval if able to take PO  1 unit of PRBC overnight   Wean NE  Continue lactulose     Assessment & Plan  Juan Quiroz is a 72 year old male with PMHx of recently diagnosed 4cm AAA and type II DM who presents with bright red hematemesis, intubated for airway protection. Found to have esophageal and gastric varices on EGD s/p banding x5 with new diagnosis of cirrhosis.      Neuro  1. Acute pain  2. Mild hepatic encephalopathy  Plan:  -- low dose precedex gtt remains on, patient intermittently agitated   -- continue lactulose orally   -- PRN dilaudid as needed       CV  1. Hypotension, likely 2/2 hemorrhagic shock   2. AAA, recently diagnosed   3. Troponin elevation   Plan:  -- Troponin elevated 0.049-->0.17-->0.295-->0.244, now downtrending, no need to recheck   -- Echo 3/28: EF 60-65%, no regional wall motion abnormalities  -- CT abdomen shows AAA 4.9cm, previously recorded at ~ 4cm  -- LA normalized   -- recheck VBG with oxyhemoglobin        Resp:  1. Intubation for airway protection, extubated 3/28  Plan:  -- saturating well on 2L NC      GI/Nutrition  1. Upper GI bleed 2/2 esophageal and gastric varices on EGD s/p banding   2. Cirrhosis, newly diagnosed, unclear etiology   Plan:  -- NPO, will discuss advancing diet with GI  -- PPI gtt and octreotide gtt   -- GI following   -- pending work up for cirrhosis includes alpha 1 antitrypsin, hepatitis panel, mitochondrial antibody (hepatitis B surface antigen, hep B core and hep C non-reactive)  -- underwent EGD on 3/27 in  ED, found to have 2 areas of variceal bleeding in esophagus and stomach, banded x5  -- ALT, AST, alk phos, bili WNL  -- Patient has no drinking hx, no IV drug use, no recent travel, no family hx of liver disease   -- repeat abdominal US with doppler 3/28: trace ascites with no evidence of venous thrombosis  -- receiving prophylaxis for SBP per GI  -- continue lactulose  -- recheck ammonia tomorrow       Renal/  1. No prior hx.  Baseline appears to be 0.89  2. Renal cysts bilaterally   3. Hyperchloremia   Plan:  -- Monitor Cr and UOP, Cr downtrending to 0.95 today, UOP stable   -- keep bliss cath in place   -- CT abdomen shows multiple renal cysts, patient is being worked up for this at home       ID  1. Leukocytosis   2. SBP prophylaxis   Plan:  -- WBC downtrending   -- ceftriaxone for SBP prophylaxis x5 days per GI      Endocrine  1. Type II DM, hyperglycemia   Plan:  -- continue insulin gtt   -- PTA agents, glipizide and metformin   -- Keep BG  <180 for optimal healing      Heme:  1. Acute blood loss anemia  2. Thrombocytopenia   3. Coagulopathy   Plan:  -- Hgb continues to drift down, 7.0 this AM  --PRBCs on 3/27, 3/28, 3/29 and responded appropriately but Hgb continues to drift down   -- continue Q8 Hgb checks  -- given 5mg vit K, no response in INR (1.55)  -- platelets trending down, no signs of new active bleeding   -- Monitor hemoglobin.  -- Transfuse to keep > 7.0      General cares:  DVT Prophylaxis: Pneumatic Compression Devices  GI Prophylaxis: PPI  Restraints: Restraints for medical healing needed: YES  Family update by me today: No  Current lines are required for patient management  Access:  Peripheral IV (18 x3)  R IJ    Chioma Bowman     Time Spent on this Encounter   Billing:  I spent 45 minutes bedside and on the inpatient unit today managing the critical care of Juan Quiroz in relation to the issues listed in this note.    Interval History   Course reviewed. No acute events overnight.  Successfully extubated overnight. Remains mildly encephalopathic. Denies SOB, chest pain, abdominal pain or headache.      Physical Exam   Temp: 98.3  F (36.8  C) Temp src: Axillary Temp  Min: 98.2  F (36.8  C)  Max: 99.7  F (37.6  C) BP: 138/68   Heart Rate: 65 Resp: 18 SpO2: 95 % O2 Device: Nasal cannula Oxygen Delivery: 2 LPM  Vitals:    03/27/18 1122 03/27/18 1500 03/28/18 0600   Weight: 81.6 kg (180 lb) 82.7 kg (182 lb 5.1 oz) 83.5 kg (184 lb 1.4 oz)     I/O last 3 completed shifts:  In: 2551.6 [I.V.:2248.6]  Out: 2470 [Urine:2470]     GEN: Resting in bed, in no acute distress  EYES: PERRL, Anicteric sclera.   HEENT:  Normocephalic, atraumatic  CV: RRR, no murmurs  PULM/CHEST: Clear breath sounds bilaterally without rhonchi, crackles or wheeze  GI: normal bowel sounds, soft, non-tender, no rebound tenderness or guarding, no palpable hepatomegaly  : bliss catheter in place, urine yellow and clear  EXTREMITIES: no peripheral edema  NEURO: awake, oriented to person and place, follows intermittent commands, moves upper and lower extremities   SKIN: warm and dry  Imaging personally reviewed: no new imaging   ECG- NSR    Data     Recent Labs  Lab 03/29/18  0355 03/28/18  1515 03/28/18  1032 03/28/18  0400 03/28/18  0210 03/27/18  2323  03/27/18  1953 03/27/18  1605 03/27/18  1120   WBC 7.5  --  13.5* 19.3* 20.7*  --   < > 25.4* 18.6* 12.1*   HGB 7.0* 7.9* 6.9* 7.5* 7.8*  --   < > 8.7* 7.9* 8.4*   MCV 95  --  94 94 94  --   < > 96 98 98   PLT 71*  --  107* 134* 149*  --   < > 151 144* 134*   INR  --   --   --  1.55*  --   --   --   --   --  1.50*   *  --   --  144  --  140  --   --  141 141   POTASSIUM 3.7  --   --  4.9  --  5.4*  --  5.6* 5.9* 5.3   CHLORIDE 117*  --   --  111*  --  108  --   --  108 104   CO2 25  --   --  23  --  20  --   --  22 23   BUN 40*  --   --  58*  --  59*  --   --  50* 46*   CR 0.95  --   --  1.18  --  1.18  --   --  0.98 0.89   ANIONGAP 7  --   --  10  --  12  --   --  11 14   ROBERT  7.5*  --   --  7.5*  --  7.7*  --   --  7.8* 8.9   GLC 88  --   --  164*  --  262*  --   --  278* 261*   ALBUMIN 2.6*  --   --  2.9*  --   --   --   --  2.6* 2.9*   PROTTOTAL 4.8*  --   --  5.2*  --   --   --   --  5.5* 5.7*   BILITOTAL 0.6  --   --  0.4  --   --   --   --  0.4 0.9   ALKPHOS 41  --   --  46  --   --   --   --  65 67   ALT 30  --   --  32  --   --   --   --  34 33   AST 31  --   --  28  --   --   --   --  29 28   TROPI  --   --   --   --  0.244*  --   --  0.295* 0.178* 0.049*   < > = values in this interval not displayed.

## 2018-03-29 NOTE — PLAN OF CARE
Problem: Patient Care Overview  Goal: Plan of Care/Patient Progress Review  Outcome: Improving  Extubated at 1655 w/o complication, pt remains on aerosol mask. Becoming more alert, still confused and impulsive. Still requiring low dose levo. Following serial hgb, 1 unit PRBC given. Multiple loose, black, tarry stools. Continuing protonix and octreotide gtt's. Elevated ammonia, will start lactulose. Arterial line dampened, now unable to draw blood, will dc. Pt's wife updated on POC extensively at bedside, supportive.   Marie Farrell RN

## 2018-03-29 NOTE — PROVIDER NOTIFICATION
Notified Dr. Banks GI- pt coughed up small amount of red blood w/ small, dark clots. Otherwise stable, unchanged. MD stated could be old blood from EGD. No NG/OG tubes should be placed and pt ok to take PO meds, including lactulose, otherwise pt should remain NPO.   Marie Farrell RN

## 2018-03-29 NOTE — PROGRESS NOTES
"GASTROENTEROLOGY PROGRESS NOTE    CC:     SUBJECTIVE:  Feels like \"death warmed over\" Complains of cold feet and abdominal pain. Has had 6 maroon stools overnight    OBJECTIVE:  General Appearance:  Awake alert NAD  BP 95/61  Temp 97.3  F (36.3  C) (Bladder)  Resp 13  Ht 1.753 m (5' 9\")  Wt 83.5 kg (184 lb 1.4 oz)  SpO2 97%  BMI 27.18 kg/m2  Temp (24hrs), Av.2  F (36.8  C), Min:93.4  F (34.1  C), Max:99.7  F (37.6  C)    Patient Vitals for the past 72 hrs:   Weight   18 0600 83.5 kg (184 lb 1.4 oz)   18 1500 82.7 kg (182 lb 5.1 oz)   18 1122 81.6 kg (180 lb)       Intake/Output Summary (Last 24 hours) at 18 1301  Last data filed at 18 1215   Gross per 24 hour   Intake          5960.88 ml   Output             2275 ml   Net          3685.88 ml       PHYSICAL EXAM    Cor: RRR  Abd: S mild epigastric tenderness +bs ND        Additional Comments:  ROS, FH, SH: See initial GI consult for details.    I have reviewed the patient's new clinical lab results:    Recent Labs   Lab Test  18   0955  18   0355  18   1515  18   1032  18   0400   18   1120   WBC   --   7.5   --   13.5*  19.3*   < >  12.1*   HGB  8.1*  7.0*  7.9*  6.9*  7.5*   < >  8.4*   MCV   --   95   --   94  94   < >  98   PLT   --   71*   --   107*  134*   < >  134*   INR   --    --    --    --   1.55*   --   1.50*    < > = values in this interval not displayed.     Recent Labs   Lab Test  18   0355  18   0400  18   2323   POTASSIUM  3.7  4.9  5.4*   CHLORIDE  117*  111*  108   CO2  25  23  20   BUN  40*  58*  59*   ANIONGAP  7  10  12     Recent Labs   Lab Test  18   0355  18   0400  18   1605   ALBUMIN  2.6*  2.9*  2.6*   BILITOTAL  0.6  0.4  0.4   ALT  30  32  34   AST  31  28  29         Active Problems:    Upper GI bleed, cirrhosis with portal HTN    Assessment: Stable    Plan:   1. Consider EGD tomorrow if hgb keeps drifting down  2. BID PPI " ok  3. D/c octreotide after 72 hours, then start nadolol  4. Clears today ok  5. Decrease dose of lactulose to 10 g TID  6. D/c ceftriaxone after 5 days  7. Cirrhosis due to fatty liver? Awaiting full work up        Hanny Martinez MD  [facility]  Pager: [phone]  Office: [phone]

## 2018-03-29 NOTE — PLAN OF CARE
Problem: Patient Care Overview  Goal: Plan of Care/Patient Progress Review  Outcome: Improving  Continues to have loose maroon stools last hgb check 8.1 , norepi gtt off this am , map > 60, skin warm and dry, adequate urine output, tolerating clear liquids

## 2018-03-29 NOTE — PLAN OF CARE
Problem: Patient Care Overview  Goal: Plan of Care/Patient Progress Review  Outcome: No Change  PT wakening more throughout night. Disorientated to situation, time and place. Clear speech but illogical at times. On 2L nasal cannula. Sinus maty on tele. On 0.05 of Levo to maintain MAP of 60. Adequate UOP. Hemoglobin 7 this AM, 1 unit of PRBC given. Ammonia high, PO lactulose given. Octreotide and Protonix drips. Will continue to monitor.

## 2018-03-30 LAB
ALBUMIN SERPL-MCNC: 2.7 G/DL (ref 3.4–5)
ALP SERPL-CCNC: 45 U/L (ref 40–150)
ALT SERPL W P-5'-P-CCNC: 30 U/L (ref 0–70)
AMMONIA PLAS-SCNC: 42 UMOL/L (ref 10–50)
ANION GAP SERPL CALCULATED.3IONS-SCNC: 8 MMOL/L (ref 3–14)
AST SERPL W P-5'-P-CCNC: 33 U/L (ref 0–45)
BILIRUB SERPL-MCNC: 0.9 MG/DL (ref 0.2–1.3)
BUN SERPL-MCNC: 27 MG/DL (ref 7–30)
CALCIUM SERPL-MCNC: 7.5 MG/DL (ref 8.5–10.1)
CHLORIDE SERPL-SCNC: 114 MMOL/L (ref 94–109)
CO2 SERPL-SCNC: 24 MMOL/L (ref 20–32)
CREAT SERPL-MCNC: 0.86 MG/DL (ref 0.66–1.25)
ERYTHROCYTE [DISTWIDTH] IN BLOOD BY AUTOMATED COUNT: 16.2 % (ref 10–15)
GFR SERPL CREATININE-BSD FRML MDRD: 86 ML/MIN/1.7M2
GLUCOSE BLDC GLUCOMTR-MCNC: 155 MG/DL (ref 70–99)
GLUCOSE BLDC GLUCOMTR-MCNC: 163 MG/DL (ref 70–99)
GLUCOSE BLDC GLUCOMTR-MCNC: 164 MG/DL (ref 70–99)
GLUCOSE BLDC GLUCOMTR-MCNC: 166 MG/DL (ref 70–99)
GLUCOSE BLDC GLUCOMTR-MCNC: 198 MG/DL (ref 70–99)
GLUCOSE BLDC GLUCOMTR-MCNC: 211 MG/DL (ref 70–99)
GLUCOSE BLDC GLUCOMTR-MCNC: 214 MG/DL (ref 70–99)
GLUCOSE BLDC GLUCOMTR-MCNC: 225 MG/DL (ref 70–99)
GLUCOSE SERPL-MCNC: 199 MG/DL (ref 70–99)
HCT VFR BLD AUTO: 22.7 % (ref 40–53)
HGB BLD-MCNC: 8 G/DL (ref 13.3–17.7)
MCH RBC QN AUTO: 33.5 PG (ref 26.5–33)
MCHC RBC AUTO-ENTMCNC: 35.2 G/DL (ref 31.5–36.5)
MCV RBC AUTO: 95 FL (ref 78–100)
PLATELET # BLD AUTO: 52 10E9/L (ref 150–450)
POTASSIUM SERPL-SCNC: 3.5 MMOL/L (ref 3.4–5.3)
PROT SERPL-MCNC: 5 G/DL (ref 6.8–8.8)
RBC # BLD AUTO: 2.39 10E12/L (ref 4.4–5.9)
SMA IGG SER-ACNC: 15 UNITS (ref 0–19)
SODIUM SERPL-SCNC: 146 MMOL/L (ref 133–144)
WBC # BLD AUTO: 4.1 10E9/L (ref 4–11)

## 2018-03-30 PROCEDURE — 25000132 ZZH RX MED GY IP 250 OP 250 PS 637: Mod: GY | Performed by: PHYSICIAN ASSISTANT

## 2018-03-30 PROCEDURE — 80053 COMPREHEN METABOLIC PANEL: CPT | Performed by: PHYSICIAN ASSISTANT

## 2018-03-30 PROCEDURE — 25000128 H RX IP 250 OP 636: Performed by: PHYSICIAN ASSISTANT

## 2018-03-30 PROCEDURE — 12000000 ZZH R&B MED SURG/OB

## 2018-03-30 PROCEDURE — 40000275 ZZH STATISTIC RCP TIME EA 10 MIN

## 2018-03-30 PROCEDURE — 85027 COMPLETE CBC AUTOMATED: CPT | Performed by: PHYSICIAN ASSISTANT

## 2018-03-30 PROCEDURE — 82140 ASSAY OF AMMONIA: CPT | Performed by: INTERNAL MEDICINE

## 2018-03-30 PROCEDURE — 94640 AIRWAY INHALATION TREATMENT: CPT

## 2018-03-30 PROCEDURE — 25000132 ZZH RX MED GY IP 250 OP 250 PS 637: Mod: GY | Performed by: INTERNAL MEDICINE

## 2018-03-30 PROCEDURE — 25000131 ZZH RX MED GY IP 250 OP 636 PS 637: Mod: GY | Performed by: NURSE PRACTITIONER

## 2018-03-30 PROCEDURE — 25000125 ZZHC RX 250: Performed by: NURSE PRACTITIONER

## 2018-03-30 PROCEDURE — 94640 AIRWAY INHALATION TREATMENT: CPT | Mod: 76

## 2018-03-30 PROCEDURE — A9270 NON-COVERED ITEM OR SERVICE: HCPCS | Mod: GY | Performed by: INTERNAL MEDICINE

## 2018-03-30 PROCEDURE — A9270 NON-COVERED ITEM OR SERVICE: HCPCS | Mod: GY | Performed by: PHYSICIAN ASSISTANT

## 2018-03-30 PROCEDURE — 99233 SBSQ HOSP IP/OBS HIGH 50: CPT | Performed by: NURSE PRACTITIONER

## 2018-03-30 PROCEDURE — 00000146 ZZHCL STATISTIC GLUCOSE BY METER IP

## 2018-03-30 RX ORDER — IPRATROPIUM BROMIDE AND ALBUTEROL SULFATE 2.5; .5 MG/3ML; MG/3ML
3 SOLUTION RESPIRATORY (INHALATION) EVERY 4 HOURS
Status: DISCONTINUED | OUTPATIENT
Start: 2018-03-30 | End: 2018-03-31 | Stop reason: HOSPADM

## 2018-03-30 RX ORDER — NICOTINE POLACRILEX 4 MG
15-30 LOZENGE BUCCAL
Status: DISCONTINUED | OUTPATIENT
Start: 2018-03-30 | End: 2018-03-30

## 2018-03-30 RX ORDER — NADOLOL 20 MG/1
20 TABLET ORAL DAILY
Status: DISCONTINUED | OUTPATIENT
Start: 2018-03-30 | End: 2018-03-31 | Stop reason: HOSPADM

## 2018-03-30 RX ORDER — PANTOPRAZOLE SODIUM 40 MG/1
40 TABLET, DELAYED RELEASE ORAL EVERY MORNING
Status: DISCONTINUED | OUTPATIENT
Start: 2018-03-30 | End: 2018-03-31 | Stop reason: HOSPADM

## 2018-03-30 RX ORDER — DEXTROSE MONOHYDRATE 25 G/50ML
25-50 INJECTION, SOLUTION INTRAVENOUS
Status: DISCONTINUED | OUTPATIENT
Start: 2018-03-30 | End: 2018-03-30

## 2018-03-30 RX ORDER — LACTULOSE 10 G/15ML
10 SOLUTION ORAL 2 TIMES DAILY
Status: DISCONTINUED | OUTPATIENT
Start: 2018-03-30 | End: 2018-03-31 | Stop reason: HOSPADM

## 2018-03-30 RX ADMIN — IPRATROPIUM BROMIDE AND ALBUTEROL SULFATE 3 ML: .5; 3 SOLUTION RESPIRATORY (INHALATION) at 19:17

## 2018-03-30 RX ADMIN — TAMSULOSIN HYDROCHLORIDE 0.4 MG: 0.4 CAPSULE ORAL at 17:03

## 2018-03-30 RX ADMIN — LACTULOSE 10 G: 10 SOLUTION ORAL at 20:30

## 2018-03-30 RX ADMIN — IPRATROPIUM BROMIDE AND ALBUTEROL SULFATE 3 ML: .5; 3 SOLUTION RESPIRATORY (INHALATION) at 12:45

## 2018-03-30 RX ADMIN — INSULIN ASPART 2 UNITS: 100 INJECTION, SOLUTION INTRAVENOUS; SUBCUTANEOUS at 12:43

## 2018-03-30 RX ADMIN — PANTOPRAZOLE SODIUM 40 MG: 40 TABLET, DELAYED RELEASE ORAL at 12:16

## 2018-03-30 RX ADMIN — TAMSULOSIN HYDROCHLORIDE 0.4 MG: 0.4 CAPSULE ORAL at 00:00

## 2018-03-30 RX ADMIN — INSULIN ASPART 2 UNITS: 100 INJECTION, SOLUTION INTRAVENOUS; SUBCUTANEOUS at 17:03

## 2018-03-30 RX ADMIN — NADOLOL 20 MG: 20 TABLET ORAL at 12:16

## 2018-03-30 RX ADMIN — IPRATROPIUM BROMIDE AND ALBUTEROL SULFATE 3 ML: .5; 3 SOLUTION RESPIRATORY (INHALATION) at 23:44

## 2018-03-30 RX ADMIN — CEFTRIAXONE 1 G: 1 INJECTION, POWDER, FOR SOLUTION INTRAMUSCULAR; INTRAVENOUS at 13:07

## 2018-03-30 NOTE — PLAN OF CARE
Problem: Patient Care Overview  Goal: Plan of Care/Patient Progress Review  Outcome: Improving  hgb stable @ 8.0, tolerating full liquids , up to commode with assistance of 1,  2 small loose brown stool this am, wife at bedside both patient and wife aware of transferring to medical floor, questions answered and emotional support given

## 2018-03-30 NOTE — PROGRESS NOTES
Patients belongings cell phone, glasses and bilateral hearing aids sent with patient at time of transfer to [facility]

## 2018-03-30 NOTE — PROGRESS NOTES
[facility]    Hospitalist Progress Note    Assessment & Plan   Juan Quiroz is a 72 year old male who was admitted on 3/27/2018 for evaluation of hematemesis and melena. Mr. Quiroz was en route from his home in [place] to [place] when he developed hematemesis. He was hypotensive upon arrival. He was intubated for airway protection and blood work revealed new anemia. Emergent EGD was notable for large esophageal varices, which were banded without complete eradication. He required IV Levophed for blood pressure support and received 1- unit of PRBCs. He was extubated on 3/28/2018 and has remained hemodynamically stable. He will transfer out of ICU today.    Upper GI bleed, secondary to large esophageal varices  Acute blood loss anemia, secondary to GI bleed  Blood product transfusion  Thrombocytopenia, secondary to acute blood loss   Cirrhosis with portal hypertension, likely secondary to non-alcoholic fatty liver disease   Elevated ammonia, treated   Splenomegaly, based on imaging   Hypoalbuminemia, likely secondary to cirrhosis   Mr. Quiroz began having large amounts of hematemesis on 3/27/2018. He presented to [facility] Emergency Department hypotensive and anemic. He was intubated for airway protection and emergent EGD was notable for large (greater than 5- mm) esophageal varices. Varices were banded x 5, but without complete eradication. CT was notable for evidence of cirrhotic changes.  IV ceftriaxone was initiated x 5- days, IV octreotide initiated x 72- hours, and IV PPI was initiated. He briefly required IV Levophed for hemodynamic support and has received 1- unit PRBCs. He was successfully extubated on 3/28/2018. Work-up for other causes of chronic liver disease are negative; HFE gene mutation was not performed. He has not had any further signs of GI bleeding and is stable to transfer to the floor today.   - appreciate [facility] input  - discontinue octreotide after total of 72- hours of IV  infusion  - initiate nadolol today; per GI titrate to HR in the 60s  - last dose of IV ceftriaxone 3/31/2018  - continue BID oral Protonix   - repeat EGD in 3-4 weeks to reassess varices, sooner if bleeding re-develops; patient's wife contacting PCP in [place] today for GI referral   - continue lactulose, titrate to 3-4 BMs daily, will need to discharge on this medication  - continue to monitor hemoglobin   - okay for full liquid diet   - continue to monitor alpha one antitrypsin result     Elevated troponin, peak 0.295, likely secondary to demand ischemia  Mr. Qiuroz has never been diagnosed with CAD and he is able to exercise regularly without shortness of breath or chest pain. He does have risk factors for ACS, including gender, age, and smoking history. An echocardiogram on 3/28/2018 documented an EF of 60-65% without evidence of structural abnormality.   - continue to monitor     Hypernatremia, mild  Hyperchloremia, mild  Hypocalcemia, 7.5, likely secondary to critical illness  Above is likely secondary to IV fluid volume and critical illness. No acute concerns at this time.  - continue to trend and treat if indicated.     Diabetes mellitus, oral agents, hemoglobin A1c 8.0%, 3/27/2018  Chronic, stable. He takes twice daily Glipizide and Metformin. He has an Endocrinologist in [place].  - continue to hold oral agents  - correction scale for now  - when eating recommend moderate CHO diet     Abdominal aortic aneurysm, 4.9- cm infrarenal  Bilateral common iliac artery aneurysm, right 2.1- cm and left 2.9- cm  Mr. Quiroz has been under surveillance of the AAA for several years, TIFF aneurysms recently diagnosed. He is following with a Surgeon in [place].  - supportive care  - highly recommend continued surveillance and follow-up as previously planned     Renal mass versus cyst   He has been undergoing work-up for a right renal mass versus cyst with recent MRI in [place]. 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   - highly encouraged continued follow-up as previously     Bilateral renal artery stenosis  CTA on 3/27/2018 was notable for 2- right renal arteries with the dominant more inferior right artery having significant proximal stenosis and question of focal dissection at the origin of the left renal artery contributing to a moderate stenosis. Mr. Quiroz and his wife have never been told of the stenosis previously. He is not hypertensive. His creatinine bumped modestly in-hospital and today is 0.86.   - continue to monitor   - highly recommend close follow-up with PCP upon return home    DVT Prophylaxis: Pneumatic Compression Devices  Code Status: Full code. Mr. Quiroz shares with me he has an advanced directive and would want resuscitative measures unless efforts are deemed futile. His wife is in agreement.    Disposition: Expected discharge in 1-3 days once IV antibiotics are completed and his hemoglobin remains stable.    JERRELL Murphy, CNP  Hospitalist Service, House Officer  [facility]     Text Page  Pager: [phone]  Physical Exam   Temp: 98.6  F (37  C) Temp src: Bladder BP: 126/63   Heart Rate: 65 Resp: 9 SpO2: 99 % O2 Device: Nasal cannula Oxygen Delivery: 2 LPM  Vitals:    03/27/18 1122 03/27/18 1500 03/28/18 0600   Weight: 81.6 kg (180 lb) 82.7 kg (182 lb 5.1 oz) 83.5 kg (184 lb 1.4 oz)     Vital Signs with Ranges  Temp:  [95.9  F (35.5  C)-99.1  F (37.3  C)] 98.6  F (37  C)  Heart Rate:  [53-77] 65  Resp:  [7-47] 9  BP: ()/(47-78) 126/63  SpO2:  [92 %-100 %] 99 %  I/O last 3 completed shifts:  In: 1482.03 [P.O.:600; I.V.:882.03]  Out: 1695 [Urine:1695]    General: Appears stated age, no acute distress.  Skin:  Warm, dry. No rashes or lesions on exposed skin.  HEENT:  Normocephalic, atraumatic.  Chest:  Bilateral anterior and posterior lung fields clear to auscultation. No increased work of  breathing. Does require supplemental oxygen.  Cardiovascular:  Regular rate and rhythm, without murmur, rub, or gallop. Bilateral upper and lower distal pulses palpable.   Abdomen:  Soft, non-tender, non-distended. Bowel sounds present.   Musculoskeletal:  Moves all four extremities.   Neurological:  Alert and oriented x 4. Cranial nerves II-XII grossly intact.   Psychiatric:  Affect and mood congruent.    Medications     dexmedetomidine Stopped (03/29/18 0927)     octreotide (sandoSTATIN) infusion ADULT 50 mcg/hr (03/30/18 0836)     sodium chloride 10 mL/hr at 03/29/18 1330     IV fluid REPLACEMENT ONLY       insulin (regular) Stopped (03/30/18 1000)       lactulose  10 g Oral BID     nadolol  20 mg Oral Daily     pantoprazole  40 mg Oral QAM     tamsulosin  0.4 mg Oral Daily     cefTRIAXone  1 g Intravenous Q24H       Data     Recent Labs  Lab 03/30/18  0436 03/29/18  0955 03/29/18  0355  03/28/18  1032 03/28/18  0400 03/28/18  0210  03/27/18  1953 03/27/18  1605 03/27/18  1120   WBC 4.1  --  7.5  --  13.5* 19.3* 20.7*  < > 25.4* 18.6* 12.1*   HGB 8.0* 8.1* 7.0*  < > 6.9* 7.5* 7.8*  < > 8.7* 7.9* 8.4*   MCV 95  --  95  --  94 94 94  < > 96 98 98   PLT 52*  --  71*  --  107* 134* 149*  < > 151 144* 134*   INR  --   --   --   --   --  1.55*  --   --   --   --  1.50*   *  --  149*  --   --  144  --   < >  --  141 141   POTASSIUM 3.5  --  3.7  --   --  4.9  --   < > 5.6* 5.9* 5.3   CHLORIDE 114*  --  117*  --   --  111*  --   < >  --  108 104   CO2 24  --  25  --   --  23  --   < >  --  22 23   BUN 27  --  40*  --   --  58*  --   < >  --  50* 46*   CR 0.86  --  0.95  --   --  1.18  --   < >  --  0.98 0.89   ANIONGAP 8  --  7  --   --  10  --   < >  --  11 14   ROBERT 7.5*  --  7.5*  --   --  7.5*  --   < >  --  7.8* 8.9   *  --  88  --   --  164*  --   < >  --  278* 261*   ALBUMIN 2.7*  --  2.6*  --   --  2.9*  --   --   --  2.6* 2.9*   PROTTOTAL 5.0*  --  4.8*  --   --  5.2*  --   --   --  5.5* 5.7*    BILITOTAL 0.9  --  0.6  --   --  0.4  --   --   --  0.4 0.9   ALKPHOS 45  --  41  --   --  46  --   --   --  65 67   ALT 30  --  30  --   --  32  --   --   --  34 33   AST 33  --  31  --   --  28  --   --   --  29 28   TROPI  --   --   --   --   --   --  0.244*  --  0.295* 0.178* 0.049*   < > = values in this interval not displayed.

## 2018-03-30 NOTE — PLAN OF CARE
Problem: Patient Care Overview  Goal: Plan of Care/Patient Progress Review  Outcome: Improving  Pt had 2 maroon stools over night, 2 liver consistency stools. Pt alert and orientated, intermittent forgetfulness. SR, blood pressure stable. Lungs clear, RA. Hemoglobin 8 this AM. Receiving lactulose for high Ammonia levels. Will continue to monitor.

## 2018-03-30 NOTE — PLAN OF CARE
Problem: Patient Care Overview  Goal: Plan of Care/Patient Progress Review  Outcome: Improving  Patient transferred from ICU at 1415. VSS. No signs of bleeding. Skin intact. LS are clear. Patient has bilateral hearing aides, glasses on. Cellphone with  in bag. Patient's blue bag with passports in room and wife Shelbi claimed it and will take it home. On telemetry. Full liquid diet, on SS. Continue to monitor.

## 2018-03-30 NOTE — PROGRESS NOTES
"GASTROENTEROLOGY PROGRESS NOTE    CC:     SUBJECTIVE:  Feels better today. Less abd pain. Passed maroon stools overnight    OBJECTIVE:  General Appearance:  Awake alert NAD  /68  Temp 97.7  F (36.5  C)  Resp 18  Ht 1.753 m (5' 9\")  Wt 83.5 kg (184 lb 1.4 oz)  SpO2 97%  BMI 27.18 kg/m2  Temp (24hrs), Av.3  F (36.8  C), Min:93.4  F (34.1  C), Max:99.1  F (37.3  C)    Patient Vitals for the past 72 hrs:   Weight   18 0600 83.5 kg (184 lb 1.4 oz)   18 1500 82.7 kg (182 lb 5.1 oz)   18 1122 81.6 kg (180 lb)       Intake/Output Summary (Last 24 hours) at 18 0950  Last data filed at 18 0815   Gross per 24 hour   Intake          1425.34 ml   Output             1495 ml   Net           -69.66 ml       PHYSICAL EXAM    Cor RRR  Abd: S NT ND        Additional Comments:  ROS, FH, SH: See initial GI consult for details.    I have reviewed the patient's new clinical lab results:    Recent Labs   Lab Test  18   0436  18   0955  18   0355   18   1032  18   0400   18   1120   WBC  4.1   --   7.5   --   13.5*  19.3*   < >  12.1*   HGB  8.0*  8.1*  7.0*   < >  6.9*  7.5*   < >  8.4*   MCV  95   --   95   --   94  94   < >  98   PLT  52*   --   71*   --   107*  134*   < >  134*   INR   --    --    --    --    --   1.55*   --   1.50*    < > = values in this interval not displayed.     Recent Labs   Lab Test  18   0436  18   0355  18   0400   POTASSIUM  3.5  3.7  4.9   CHLORIDE  114*  117*  111*   CO2  24  25  23   BUN  27  40*  58*   ANIONGAP  8  7  10     Recent Labs   Lab Test  18  18   0355  18   0400   ALBUMIN  2.7*  2.6*  2.9*   BILITOTAL  0.9  0.6  0.4   ALT  30  30  32   AST  33  31  28         Active Problems:  Cirrhosis with portal hypertension, variceal bleed. No evidence of active bleeding, hgb stable overnight. Suspect cirrhosis due to NAFLD. Iron saturation high but iron level normal. Will not order " HFE gene mutation since he will be back home in [place] before results are back. Awaiting alpha one antitrypsin, otherwise w/u for other cause of chronic liver disease are negative.  -D/c octreotide this pm after 72 hours  -Start nadolol, titrate to HR in 60's  -Stop ceftriaxone after tomorrow's dose  -BID PPI x 6 weeks  -Continue lactulose, titrate to 3-4 BM per day, should be discharged on this med  -Rescope if rebleeds  -Pt's wife to contact PMD today to set up referral to GI when he gets home. Will need repeat EGD to reassess varices in 3-4 weeks  -Transfusions per hospitalist service    Hanny Martinez MD  [facility]  Pager [phone]  Office [phone]

## 2018-03-31 VITALS
OXYGEN SATURATION: 98 % | DIASTOLIC BLOOD PRESSURE: 51 MMHG | WEIGHT: 184.08 LBS | HEIGHT: 69 IN | SYSTOLIC BLOOD PRESSURE: 111 MMHG | BODY MASS INDEX: 27.27 KG/M2 | HEART RATE: 57 BPM | RESPIRATION RATE: 18 BRPM | TEMPERATURE: 98.5 F

## 2018-03-31 LAB
A1AT PHENOTYP SERPL-IMP: NORMAL
A1AT SERPL-MCNC: 123 MG/DL (ref 90–200)
ALBUMIN SERPL-MCNC: 2.7 G/DL (ref 3.4–5)
ALP SERPL-CCNC: 52 U/L (ref 40–150)
ALT SERPL W P-5'-P-CCNC: 30 U/L (ref 0–70)
ANION GAP SERPL CALCULATED.3IONS-SCNC: 8 MMOL/L (ref 3–14)
AST SERPL W P-5'-P-CCNC: 31 U/L (ref 0–45)
BILIRUB SERPL-MCNC: 1.1 MG/DL (ref 0.2–1.3)
BUN SERPL-MCNC: 22 MG/DL (ref 7–30)
CALCIUM SERPL-MCNC: 7.6 MG/DL (ref 8.5–10.1)
CHLORIDE SERPL-SCNC: 107 MMOL/L (ref 94–109)
CO2 SERPL-SCNC: 22 MMOL/L (ref 20–32)
CREAT SERPL-MCNC: 0.93 MG/DL (ref 0.66–1.25)
ERYTHROCYTE [DISTWIDTH] IN BLOOD BY AUTOMATED COUNT: 16.3 % (ref 10–15)
GFR SERPL CREATININE-BSD FRML MDRD: 79 ML/MIN/1.7M2
GLUCOSE BLDC GLUCOMTR-MCNC: 200 MG/DL (ref 70–99)
GLUCOSE BLDC GLUCOMTR-MCNC: 203 MG/DL (ref 70–99)
GLUCOSE SERPL-MCNC: 257 MG/DL (ref 70–99)
HCT VFR BLD AUTO: 24.8 % (ref 40–53)
HGB BLD-MCNC: 8.6 G/DL (ref 13.3–17.7)
MCH RBC QN AUTO: 33 PG (ref 26.5–33)
MCHC RBC AUTO-ENTMCNC: 34.7 G/DL (ref 31.5–36.5)
MCV RBC AUTO: 95 FL (ref 78–100)
PLATELET # BLD AUTO: 67 10E9/L (ref 150–450)
POTASSIUM SERPL-SCNC: 3.9 MMOL/L (ref 3.4–5.3)
PROT SERPL-MCNC: 5.4 G/DL (ref 6.8–8.8)
RBC # BLD AUTO: 2.61 10E12/L (ref 4.4–5.9)
SODIUM SERPL-SCNC: 137 MMOL/L (ref 133–144)
WBC # BLD AUTO: 6.3 10E9/L (ref 4–11)

## 2018-03-31 PROCEDURE — 36415 COLL VENOUS BLD VENIPUNCTURE: CPT | Performed by: INTERNAL MEDICINE

## 2018-03-31 PROCEDURE — 99239 HOSP IP/OBS DSCHRG MGMT >30: CPT | Performed by: INTERNAL MEDICINE

## 2018-03-31 PROCEDURE — 00000146 ZZHCL STATISTIC GLUCOSE BY METER IP

## 2018-03-31 PROCEDURE — 85027 COMPLETE CBC AUTOMATED: CPT | Performed by: INTERNAL MEDICINE

## 2018-03-31 PROCEDURE — 94640 AIRWAY INHALATION TREATMENT: CPT

## 2018-03-31 PROCEDURE — 25000125 ZZHC RX 250: Performed by: NURSE PRACTITIONER

## 2018-03-31 PROCEDURE — 25000132 ZZH RX MED GY IP 250 OP 250 PS 637: Mod: GY | Performed by: INTERNAL MEDICINE

## 2018-03-31 PROCEDURE — A9270 NON-COVERED ITEM OR SERVICE: HCPCS | Mod: GY | Performed by: INTERNAL MEDICINE

## 2018-03-31 PROCEDURE — 40000275 ZZH STATISTIC RCP TIME EA 10 MIN

## 2018-03-31 PROCEDURE — 40000894 ZZH STATISTIC OT IP EVAL DEFER

## 2018-03-31 PROCEDURE — 80053 COMPREHEN METABOLIC PANEL: CPT | Performed by: INTERNAL MEDICINE

## 2018-03-31 PROCEDURE — A9270 NON-COVERED ITEM OR SERVICE: HCPCS | Mod: GY | Performed by: PHYSICIAN ASSISTANT

## 2018-03-31 PROCEDURE — 25000132 ZZH RX MED GY IP 250 OP 250 PS 637: Mod: GY | Performed by: PHYSICIAN ASSISTANT

## 2018-03-31 RX ORDER — NADOLOL 20 MG/1
20 TABLET ORAL DAILY
Qty: 30 TABLET | Refills: 0 | Status: SHIPPED | OUTPATIENT
Start: 2018-04-01

## 2018-03-31 RX ORDER — LACTULOSE 10 G/15ML
10 SOLUTION ORAL 2 TIMES DAILY
Qty: 500 ML | Refills: 0 | Status: SHIPPED | OUTPATIENT
Start: 2018-03-31

## 2018-03-31 RX ORDER — PANTOPRAZOLE SODIUM 40 MG/1
40 TABLET, DELAYED RELEASE ORAL EVERY MORNING
Qty: 30 TABLET | Refills: 0 | Status: SHIPPED | OUTPATIENT
Start: 2018-04-01

## 2018-03-31 RX ADMIN — NADOLOL 20 MG: 20 TABLET ORAL at 08:35

## 2018-03-31 RX ADMIN — INSULIN ASPART 2 UNITS: 100 INJECTION, SOLUTION INTRAVENOUS; SUBCUTANEOUS at 08:35

## 2018-03-31 RX ADMIN — IPRATROPIUM BROMIDE AND ALBUTEROL SULFATE 3 ML: .5; 3 SOLUTION RESPIRATORY (INHALATION) at 02:41

## 2018-03-31 RX ADMIN — LACTULOSE 10 G: 10 SOLUTION ORAL at 08:34

## 2018-03-31 RX ADMIN — PANTOPRAZOLE SODIUM 40 MG: 40 TABLET, DELAYED RELEASE ORAL at 08:35

## 2018-03-31 ASSESSMENT — ACTIVITIES OF DAILY LIVING (ADL)
FALL_HISTORY_WITHIN_LAST_SIX_MONTHS: NO
DRESS: 1-->ASSISTIVE EQUIPMENT
BATHING: 1-->ASSISTIVE EQUIPMENT
TOILETING: 1-->ASSISTIVE EQUIPMENT
TRANSFERRING: 0-->INDEPENDENT
SWALLOWING: 0 - SWALLOWS FOODS/LIQUIDS WITHOUT DIFFICULTY
RETIRED_EATING: 1-->ASSISTIVE EQUIPMENT
AMBULATION: 2 - ASSISTIVE PERSON
BATHING: 2 - ASSISTIVE PERSON
RETIRED_COMMUNICATION: 0-->UNDERSTANDS/COMMUNICATES WITHOUT DIFFICULTY
TRANSFERRING: 2 - ASSISTIVE PERSON
TOILETING: 2 - ASSISTIVE PERSON
COGNITION: 1 - ATTENTION OR MEMORY DEFICITS
SWALLOWING: 0-->SWALLOWS FOODS/LIQUIDS WITHOUT DIFFICULTY
COMMUNICATION: 0 - UNDERSTANDS/COMMUNICATES WITHOUT DIFFICULTY
WHICH_OF_THE_ABOVE_FUNCTIONAL_RISKS_HAD_A_RECENT_ONSET_OR_CHANGE?: COGNITION
AMBULATION: 0-->INDEPENDENT
EATING: 0 - INDEPENDENT
DRESS: 2 - ASSISTIVE PERSON

## 2018-03-31 NOTE — DISCHARGE SUMMARY
[facility]    Discharge Summary  Hospitalist    Date of Admission:  3/27/2018  Date of Discharge:  3/31/2018  2:15 PM  Discharging Provider: Renetta Ramirez MD    Discharge Diagnoses   Upper GI bleed secondary to large esophageal varices  Acute blood loss anemia status post transfusion  Thrombocytopenia  Cirrhosis with portal hypertension, likely secondary to NSH  Splenomegaly  Hypoalbuminemia  Elevated ammonia related to above  Elevated troponin likely secondary to demand ischemia  Type 2 diabetes  Hypernatremia/hyperchloremia/hypocalcemia, resolved  History of abdominal aortic aneurysm  Bilateral common iliac artery aneurysm  Renal mass versus cyst  Bilateral renal artery stenosis    History of Present Illness   Juan Quiroz is a 72 year old male who was admitted on 3/27/2018 for evaluation of hematemesis and melena. Mr. Quiroz was en route from his home in [place] to [place] when he developed hematemesis. He was hypotensive upon arrival. He was intubated for airway protection and blood work revealed new anemia. Emergent EGD was notable for large esophageal varices, which were banded without complete eradication. He required IV Levophed for blood pressure support and received 1- unit of PRBCs. He was extubated on 3/28/2018 and has remained hemodynamically stable.  Please see admission H&P for details    Hospital Course   Juan Quiroz was admitted on 3/27/2018.  The following problems were addressed during his hospitalization:       Upper GI bleed, secondary to large esophageal varices  Acute blood loss anemia, secondary to GI bleed  Blood product transfusion  Thrombocytopenia, secondary to acute blood loss   Cirrhosis with portal hypertension, likely secondary to non-alcoholic fatty liver disease   Elevated ammonia, treated   Splenomegaly, based on imaging   Hypoalbuminemia, likely secondary to cirrhosis   Mr. Quiroz began having large amounts of hematemesis on 3/27/2018. He  presented to [facility] Emergency Department hypotensive and anemic. He was intubated for airway protection and emergent EGD was notable for large (greater than 5- mm) esophageal varices. Varices were banded x 5, but without complete eradication. CT was notable for evidence of cirrhotic changes.  IV ceftriaxone was initiated x 5- days, IV octreotide initiated x 72- hours, and IV PPI was initiated. He briefly required IV Levophed for hemodynamic support and has received 1- unit PRBCs. He was successfully extubated on 3/28/2018. Work-up for other causes of chronic liver disease are negative; HFE gene mutation was not performed. He has not had any further signs of GI bleeding and is stable to transfer to the floor today.  GI followed patient in house.  After stabilization the patient was transferred out of ICU.  Started on nadolol which the patient tolerated.  Patient will be discharged with oral Protonix and lactulose with a goal of 2-3 loose stool.  Diet changed to soft diet  Patient to follow-up with his PCP/GI upon discharge for further evaluation of his cirrhosis and follow-up and will need a repeat EGD     Elevated troponin, peak 0.295, likely secondary to demand ischemia  Mr. Quiroz has never been diagnosed with CAD and he is able to exercise regularly without shortness of breath or chest pain. He does have risk factors for ACS, including gender, age, and smoking history. An echocardiogram on 3/28/2018 documented an EF of 60-65% without evidence of structural abnormality or wall motion abnormality.   -He denied any chest pain and his troponins were elevated in the setting of demand ischemia with severe anemia and hypertension.  Outpatient follow-up once stable     Hypernatremia, mild  Hyperchloremia, mild  Hypocalcemia, 7.5, likely secondary to critical illness  Above is likely secondary to IV fluid volume and critical illness.  Resolved prior to discharge     Diabetes mellitus, oral agents, hemoglobin A1c 8.0%,  3/27/2018  Chronic, stable. He takes twice daily Glipizide and Metformin. He has an Endocrinologist in [place].  -Patient preferred to be discharged on as needed insulin and will follow up with his primary endocrinologist     Abdominal aortic aneurysm, 4.9- cm infrarenal  Bilateral common iliac artery aneurysm, right 2.1- cm and left 2.9- cm  Mr. Quiroz has been under surveillance of the AAA for several years, TIFF aneurysms recently diagnosed. He is following with a Surgeon in [place].  - supportive care  - highly recommend continued surveillance and follow-up as previously planned      Renal mass versus cyst   He has been undergoing work-up for a right renal mass versus cyst with recent MRI in [place]. Ultrasound on 3/28/2018 was notable for an echogenic area in the mid-right kidney, 1.6- cm, possibly an angiomyolipoma.   - highly encouraged continued follow-up as previously      Bilateral renal artery stenosis  CTA on 3/27/2018 was notable for 2- right renal arteries with the dominant more inferior right artery having significant proximal stenosis and question of focal dissection at the origin of the left renal artery contributing to a moderate stenosis. Mr. Quiroz and his wife have never been told of the stenosis previously. He is not hypertensive. His creatinine bumped modestly in-hospital and today is 0.86.   - continue to monitor   - highly recommend close follow-up with PCP upon return home     # Discharge Pain Plan:   - Patient currently has NO PAIN and is not being prescribed pain medications on discharge.    Active Problems:    Upper GI bleed      Renetta Ramirez MD    Significant Results and Procedures   EGD    Pending Results   These results will be followed up by none  Unresulted Labs Ordered in the Past 30 Days of this Admission     No orders found from 1/26/2018 to 3/28/2018.          Code Status   Full Code       Primary Care Physician   Physician No Ref-Primary    Physical Exam   Temp: 98.5  F (36.9   C) Temp src: Oral BP: 111/51 Pulse: 57 Heart Rate: 58 Resp: 18 SpO2: 98 % O2 Device: None (Room air)    Vitals:    03/27/18 1122 03/27/18 1500 03/28/18 0600   Weight: 81.6 kg (180 lb) 82.7 kg (182 lb 5.1 oz) 83.5 kg (184 lb 1.4 oz)     Vital Signs with Ranges  Temp:  [98.5  F (36.9  C)-99.1  F (37.3  C)] 98.5  F (36.9  C)  Pulse:  [57] 57  Heart Rate:  [52-58] 58  Resp:  [18-20] 18  BP: (111-112)/(51-62) 111/51  SpO2:  [95 %-98 %] 98 %  I/O last 3 completed shifts:  In: 660 [P.O.:660]  Out: 200 [Urine:200]    Exam:  Constitutional: Awake, alert and no distress. Appears comfortable  Head: Normocephalic. No masses, lesions, tenderness or abnormalities  ENT: ENT exam normal, no neck nodes or sinus tenderness  Cardiovascular: RRR.  No murmurs, no rubs or JVD  Respiratory: Normal WOB,b/l equal air entry, no wheezes or crackles   Gastrointestinal: Abdomen soft, non-tender. BS normal. No masses, organomegaly  : Deferred   extremities : No edema , no clubbing or cyanosis      Discharge Disposition   Discharged to home  Condition at discharge: Stable    Consultations This Hospital Stay   PHARMACY IP CONSULT  PHARMACY IP CONSULT  PHYSICAL THERAPY ADULT IP CONSULT  OCCUPATIONAL THERAPY ADULT IP CONSULT    Time Spent on this Encounter   IRenetta, personally saw the patient today and spent greater than 30 minutes discharging this patient.    Discharge Orders     Reason for your hospital stay   GI bleed     Follow-up and recommended labs and tests    Follow up with primary care provider,  within 7 days for hospital follow- up.   The following labs/tests are recommended: CBC, BMP    Follow up with Gastroenterologist  next available     Activity   Your activity upon discharge: activity as tolerated     Monitor and record   blood glucose 4 times a day, before meals and at bedtime. Readings to PCP for any medication adjustment     Full Code     Diet   Follow this diet upon discharge: Orders Placed This Encounter     Room  Service     Low Fiber Diet       Discharge Medications   Discharge Medication List as of 3/31/2018 12:45 PM      START taking these medications    Details   !! insulin aspart (NOVOLOG PEN) 100 UNIT/ML injection Inject 1-7 Units Subcutaneous 3 times daily (before meals), Disp-9 mL, R-0, E-Prescribe      !! insulin aspart (NOVOLOG PEN) 100 UNIT/ML injection Inject 1-5 Units Subcutaneous At Bedtime, No Print Out      nadolol (CORGARD) 20 MG tablet Take 1 tablet (20 mg) by mouth daily, Disp-30 tablet, R-0, E-Prescribe      lactulose (CHRONULAC) 10 GM/15ML solution Take 15 mLs (10 g) by mouth 2 times daily, Disp-500 mL, R-0, E-PrescribeGoal for 2-3 loose BM per day      pantoprazole (PROTONIX) 40 MG EC tablet Take 1 tablet (40 mg) by mouth every morning, Disp-30 tablet, R-0, E-Prescribe       !! - Potential duplicate medications found. Please discuss with provider.      CONTINUE these medications which have NOT CHANGED    Details   tamsulosin (FLOMAX) 0.4 MG capsule Take 0.4 mg by mouth daily, Historical      METFORMIN HCL PO Take 1,000 mg by mouth 2 times daily , Historical         STOP taking these medications       GLIPIZIDE XL PO Comments:   Reason for Stopping:         Celecoxib (CELEBREX PO) Comments:   Reason for Stopping:             Allergies   Allergies   Allergen Reactions     Prednisone      Data   Most Recent 3 CBC's:  Recent Labs   Lab Test  03/31/18   0945  03/30/18   0436  03/29/18   0955  03/29/18   0355   WBC  6.3  4.1   --   7.5   HGB  8.6*  8.0*  8.1*  7.0*   MCV  95  95   --   95   PLT  67*  52*   --   71*      Most Recent 3 BMP's:  Recent Labs   Lab Test  03/31/18   0945  03/30/18   0436  03/29/18   0355   NA  137  146*  149*   POTASSIUM  3.9  3.5  3.7   CHLORIDE  107  114*  117*   CO2  22  24  25   BUN  22  27  40*   CR  0.93  0.86  0.95   ANIONGAP  8  8  7   ROBERT  7.6*  7.5*  7.5*   GLC  257*  199*  88     Most Recent 2 LFT's:  Recent Labs   Lab Test  03/31/18   0945  03/30/18   0436   AST  31  33    ALT  30  30   ALKPHOS  52  45   BILITOTAL  1.1  0.9     Most Recent INR's and Anticoagulation Dosing History:  Anticoagulation Dose History     Recent Dosing and Labs Latest Ref Rng & Units 3/27/2018 3/28/2018    INR 0.86 - 1.14 1.50(H) 1.55(H)        Most Recent 3 Troponin's:  Recent Labs   Lab Test  03/28/18   0210  03/27/18   1953  03/27/18   1605   TROPI  0.244*  0.295*  0.178*     Most Recent Cholesterol Panel:No lab results found.  Most Recent 6 Bacteria Isolates From Any Culture (See EPIC Reports for Culture Details):No lab results found.  Most Recent TSH, T4 and A1c Labs:  Recent Labs   Lab Test  03/27/18   1605   A1C  8.0*     Results for orders placed or performed during the hospital encounter of 03/27/18   POC US ABDOMEN LIMITED    Impression    AAA identified, but no signs of rupture.   CT Abd Aorta Bilataeral Iliofem Angio    Narrative    CTA ANGIOGRAM ABDOMINAL AORTA BILATERAL ILIOFEMORAL RUNOFF  3/27/2018  12:03 PM     HISTORY: GI bleed.    COMPARISON: None.    TECHNIQUE: CT angiogram of the abdomen and pelvis with bilateral lower  extremity runoff was performed following the administration of 100 mL  contrast. Images are viewed in multiple planes. Radiation dose for  this scan was reduced using automated exposure control, adjustment of  the mA and/or kV according to patient size, or iterative  reconstruction technique.    FINDINGS: No active extravasation of contrast and bowel is detected.    There is a 4.8 x 4.9 cm infrarenal abdominal aortic aneurysm. There is  a 2.9 cm left common iliac artery aneurysm and a 2.1 cm right common  iliac artery aneurysm.    The celiac trunk, superior mesenteric artery, and inferior mesenteric  artery are patent without significant stenoses. There are 2 right  renal arteries. The dominant more inferior right renal artery has a  significant proximal stenosis. There is a question of a focal  dissection at the origin of the left renal artery which contributes to  a  moderate stenosis.    Right lower extremity angiogram:  Common femoral artery: No significant stenosis.  Profunda femoris artery: No significant stenosis.  Superficial femoral artery: No significant stenosis.  Popliteal artery: No significant stenosis.  Tibial arteries difficult to evaluate due to dilution of contrast.    Left lower extremity angiogram:  Common femoral artery: No significant stenosis.  Profunda femoris artery: No significant stenosis.  Superficial femoral artery: No significant stenosis.  Popliteal artery: No significant stenosis.  Tibial arteries: Three-vessel runoff.    Soft tissues: The liver has a nodular contour suggesting cirrhosis.  There are a few calcified granulomas in the liver. There are calcified  granulomas in the spleen. The spleen is enlarged and measures 17 cm in  length. There are multiple cysts within the kidneys. Remaining solid  organs in the abdomen appear unremarkable.    There are scattered colonic diverticuli. No evidence for acute  diverticulitis. The bowel otherwise appears grossly unremarkable.      Impression    IMPRESSION:  1. No evidence for active gastrointestinal bleeding.  2. 4.9 cm infrarenal abdominal aortic aneurysm. 2.9 cm left common  iliac artery aneurysm and 2.1 cm right common iliac artery aneurysm.  3. Stenoses involving the proximal renal arteries as above.  4. Anatomical changes of the liver suggesting cirrhosis.  5. Changes of old granulomatous disease in the liver and spleen.  6. Splenomegaly.    CHAPARRITA FERNANDEZ MD   XR Chest Port 1 View    Narrative    XR CHEST PORT 1 VW 3/27/2018 12:42 PM    HISTORY: Post intubation.    COMPARISON: None.    FINDINGS: Endotracheal tube tip appears appropriately positioned,  approximately 5 cm above the halle. Lungs are clear.      Impression    IMPRESSION: Endotracheal tube appears appropriately positioned.    SARAH TOWNSEND MD   US Abdomen Limited    Narrative    US ABDOMEN LIMITED 3/27/2018 5:21 PM    HISTORY:  Esophageal varices.    TECHNIQUE: Limited abdominal ultrasound to the right upper quadrant is  performed.    COMPARISON: None.    FINDINGS: There is diffuse fatty infiltration of the liver, which  measures 15 cm in diameter. The pancreas is obscured.    No sonographic evidence of gallstones. There is gallbladder wall  thickening measuring 4 mm. The common hepatic duct measures 2 mm in  diameter. Limited evaluation of the gallbladder secondary to patient  immobility.    There are multiple right renal cysts, the largest measuring 3.2 cm in  diameter.        Impression    IMPRESSION:    1. Limited evaluation secondary to patient immobility.  2. Diffuse fatty infiltration of liver.  3. Gallbladder wall thickening.  4. Multiple right renal cysts.    ZOILA CHAN MD   XR Chest Port 1 View    Narrative    XR CHEST PORT 1 VW 3/27/2018 5:28 PM    HISTORY: Line placement.    COMPARISON: March 27, 2018.      Impression    IMPRESSION: Endotracheal tube in position as before. Interval  placement of a right jugular venous catheter which terminates in the  superior vena cava. Lung volumes are low but otherwise clear. No  pneumothorax.    ZOILA CHAN MD   US Abdomen Complete w Doppler Complete    Narrative    ULTRASOUND ABDOMEN COMPLETE WITH DOPPLER 3/28/2018 3:36 PM     HISTORY: Assess for blood flow and ascites.    COMPARISON: 3/27/2018.    TECHNIQUE: Ultrasound gray scale, Color Doppler flow, and spectral  Doppler waveform analysis performed.    FINDINGS: Liver is coarse and increased in echogenicity without  worrisome focal solid lesions. Gallbladder is unremarkable without  stones or sludge. Extrahepatic bile duct is normal in diameter.  Pancreas is normal where visualized.  Spleen is enlarged at 15.5 cm.  Kidneys are normal in size. There is no hydronephrosis. Echogenic area  in the mid right kidney possibly an angiomyolipoma measuring 1.6 cm.  Multiple simple cysts noted. Distal abdominal aorta is aneurysmal at  4.7 cm.  Proximal IVC is nonaneurysmal. Trace ascites.    The portal vein and splenic vein are patent with appropriate direction  of flow. The intrahepatic portal vessels are patent with appropriate  directional flow. The hepatic venous system is patent with appropriate  direction of flow. The hepatic artery is patent with a normal  resistive index.      Impression    IMPRESSION:   1. Trace ascites.  2. No venous thrombosis demonstrated.  3. Echogenic area in the mid right kidney, possibly an angiomyolipoma,  but ultimately indeterminate measuring 1.6 cm.    ELVIN GOLDSTEIN MD

## 2018-03-31 NOTE — PLAN OF CARE
Problem: Patient Care Overview  Goal: Plan of Care/Patient Progress Review  Outcome: Completed Date Met: 03/31/18  Patient had VSS, denied any chest pain and SOB. No signs of bleeding, hgb was 8.6 this AM. Patient has had a few loose stools, brown/yellow colored. No complaints of pain. BGM was 203, ate well. Tolerated low fiber diet. Tele NSR. Patient is being discharged. Patient and wife will fly back [place] on Monday. Will follow up with GI there. IV access taken out and tip intact. All discharge instructions explained to patient and wife. Insulin SQ teaching provided. Patient demonstrated ability to give himself his own insulin.

## 2018-03-31 NOTE — PLAN OF CARE
Problem: Patient Care Overview  Goal: Plan of Care/Patient Progress Review  Pt alert and oriented x 4. Bradycardic HR 56; other VSS on 98% on RA. Denies pain, nausea, sob. LS clear. Up with 1, GB to BSC; uses BS urinal, as well. Voiding adequately. Tele SB. Tolerating full liquid diet. , 214. Plan to d/c in 1-3 days, once off IV antibiotics and hgb is stable. Nursing will continue to monitor.

## 2018-03-31 NOTE — PLAN OF CARE
Problem: Patient Care Overview  Goal: Plan of Care/Patient Progress Review  Discharge Planner PT   Patient plan for discharge: To hot and then fly home to [place] on Monday.  Current status: Attempted to see. Spoke with nursing.  No PT needed.  Patient at baseline and discharging from hospital right now. No PT needs.  Cancel PT.  Patient not seen by PT this hospitalization.  Barriers to return to prior living situation: Eval deferred per nursing.  Recommendations for discharge: Per medical staff involved.  Rationale for recommendations: Per medical staff involved.       Entered by: Harper Hewitt 03/31/2018 1:53 PM

## 2018-03-31 NOTE — PLAN OF CARE
Problem: Patient Care Overview  Goal: Plan of Care/Patient Progress Review  Outcome: Improving  Pt has been coping well this shift.  Pt has been oriented x 3-4 and has been slightly forgetful at times.  Pt has denied having pain and has been moving well with a minimal assist of 1 and a gait belt or a walker for stability depending on the length of the walk.  Pt has had loose stools x 2 this shift and has been incontinent x 1. Pt has been continent of urine.  Pt was showered, shaved, teeth were brushed, gown was changed, and bedding was changed this shift.  Pt has hearing aids in both ears at bedside.  Pt has been drinking well this shift.  Pt has been using his call light appropriately.  Pt vital signs have been stable and O2 sats have been maintained on RA.  Will continue to monitor.

## 2018-03-31 NOTE — PLAN OF CARE
"Problem: Patient Care Overview  Goal: Plan of Care/Patient Progress Review  Discharge Planner OT   Patient plan for discharge: Unknown.  Current status: Order rec'd and talked with RN and patient's wife. They are without concern for ADL. They note patient has been \"cleared\" for discharge, however they request a PT eval to make sure he's ambulatory. They report him being unsteady when up.  Barriers to return to prior living situation: Defer to PT.   Recommendations for discharge: Defer to PT.   Rationale for recommendations: OT eval not indicated per nursing and patient's wife.       Entered by: Echo Heredia 03/31/2018 10:24 AM         "

## 2018-03-31 NOTE — PROGRESS NOTES
"Gastroenterology Progress Note     Summary: New diagnosis cirrhosis admitted with variceal bleeding.     Impression & Plan:   1. Cirrhosis - suspect fatty liver. Needs HFE testing in [place].   - Needs outpatient follow up - this is in the process of getting set up.   - Encouraged adequate po intake including protein nutrition  2. Hepatic encephalopathy  - discussed lactulose - goal of about 3 soft stools per day  - if worsens and does not respond to lactulose needs to return to ED - discussed with wife as well  3. Variceal bleeding  - soft foods for 2 more days  - needs repeat egd in 2-3 weeks  -avoid nausea/vomiting - if occurs - come to ED    Madalyn Ordaz MD MS....................  3/31/2018   2:13 PM   Pager [phone]  After 5 PM call [phone]    [facility]          Subjective/24 hour events:  No events overnight. Discharging today    Objective:    /51 (BP Location: Left arm)  Pulse 57  Temp 98.5  F (36.9  C) (Oral)  Resp 18  Ht 1.753 m (5' 9\")  Wt 83.5 kg (184 lb 1.4 oz)  SpO2 98%  BMI 27.18 kg/m2  Temp (24hrs), Av.8  F (37.1  C), Min:98.5  F (36.9  C), Max:99.1  F (37.3  C)    No data found.      Medications (Scheduled)    lactulose  10 g Oral BID     nadolol  20 mg Oral Daily     pantoprazole  40 mg Oral QAM     ipratropium - albuterol 0.5 mg/2.5 mg/3 mL  3 mL Nebulization Q4H     insulin aspart  1-7 Units Subcutaneous TID AC     insulin aspart  1-5 Units Subcutaneous At Bedtime     tamsulosin  0.4 mg Oral Daily     cefTRIAXone  1 g Intravenous Q24H       PRN Medications  naloxone, HYDROmorphone, ondansetron **OR** ondansetron, glucose **OR** dextrose **OR** glucagon    Physical exam:  General Appearance:  Alert  Eyes: mild scleral icterus  Respiratory: bilateral bs  Gastrointestinal: mild distension  Psych: min HE    Data:  All relevant data has been reviewed. Pertinent information:     Labs:   Recent Labs   Lab Test  18   0945  18   0436  18   " 0955  03/29/18   0355   03/28/18   0400   03/27/18   1120   WBC  6.3  4.1   --   7.5   < >  19.3*   < >  12.1*   HGB  8.6*  8.0*  8.1*  7.0*   < >  7.5*   < >  8.4*   MCV  95  95   --   95   < >  94   < >  98   PLT  67*  52*   --   71*   < >  134*   < >  134*   INR   --    --    --    --    --   1.55*   --   1.50*    < > = values in this interval not displayed.     Recent Labs   Lab Test  03/31/18   0945  03/30/18   0436  03/29/18   0355   POTASSIUM  3.9  3.5  3.7   CHLORIDE  107  114*  117*   CO2  22  24  25   BUN  22  27  40*   ANIONGAP  8  8  7     Recent Labs   Lab Test  03/31/18   0945  03/30/18   0436  03/29/18   0355   ALBUMIN  2.7*  2.7*  2.6*   BILITOTAL  1.1  0.9  0.6   ALT  30  30  30   AST  31  33  31